# Patient Record
Sex: FEMALE | Race: WHITE | NOT HISPANIC OR LATINO | URBAN - METROPOLITAN AREA
[De-identification: names, ages, dates, MRNs, and addresses within clinical notes are randomized per-mention and may not be internally consistent; named-entity substitution may affect disease eponyms.]

---

## 2018-12-05 ENCOUNTER — EMERGENCY (EMERGENCY)
Facility: HOSPITAL | Age: 53
LOS: 1 days | Discharge: HOME | End: 2018-12-05
Attending: EMERGENCY MEDICINE | Admitting: EMERGENCY MEDICINE
Payer: COMMERCIAL

## 2018-12-05 VITALS
TEMPERATURE: 98 F | HEART RATE: 88 BPM | DIASTOLIC BLOOD PRESSURE: 74 MMHG | RESPIRATION RATE: 18 BRPM | SYSTOLIC BLOOD PRESSURE: 128 MMHG | OXYGEN SATURATION: 99 %

## 2018-12-05 VITALS
WEIGHT: 141.98 LBS | SYSTOLIC BLOOD PRESSURE: 134 MMHG | DIASTOLIC BLOOD PRESSURE: 92 MMHG | OXYGEN SATURATION: 97 % | RESPIRATION RATE: 16 BRPM | TEMPERATURE: 98 F | HEART RATE: 86 BPM

## 2018-12-05 DIAGNOSIS — Z90.49 ACQUIRED ABSENCE OF OTHER SPECIFIED PARTS OF DIGESTIVE TRACT: Chronic | ICD-10-CM

## 2018-12-05 LAB
ACETONE SERPL-MCNC: NEGATIVE — SIGNIFICANT CHANGE UP
ALBUMIN SERPL ELPH-MCNC: 3.8 G/DL — SIGNIFICANT CHANGE UP (ref 3.3–5)
ALP SERPL-CCNC: 101 U/L — SIGNIFICANT CHANGE UP (ref 30–120)
ALT FLD-CCNC: 68 U/L DA — HIGH (ref 10–60)
ANION GAP SERPL CALC-SCNC: 10 MMOL/L — SIGNIFICANT CHANGE UP (ref 5–17)
APPEARANCE UR: CLEAR — SIGNIFICANT CHANGE UP
AST SERPL-CCNC: 34 U/L — SIGNIFICANT CHANGE UP (ref 10–40)
BASE EXCESS BLDV CALC-SCNC: -0.6 MMOL/L — SIGNIFICANT CHANGE UP (ref -2–2)
BASOPHILS # BLD AUTO: 0.04 K/UL — SIGNIFICANT CHANGE UP (ref 0–0.2)
BASOPHILS NFR BLD AUTO: 0.4 % — SIGNIFICANT CHANGE UP (ref 0–2)
BILIRUB SERPL-MCNC: 0.3 MG/DL — SIGNIFICANT CHANGE UP (ref 0.2–1.2)
BILIRUB UR-MCNC: NEGATIVE — SIGNIFICANT CHANGE UP
BUN SERPL-MCNC: 16 MG/DL — SIGNIFICANT CHANGE UP (ref 7–23)
CALCIUM SERPL-MCNC: 9.4 MG/DL — SIGNIFICANT CHANGE UP (ref 8.4–10.5)
CHLORIDE SERPL-SCNC: 98 MMOL/L — SIGNIFICANT CHANGE UP (ref 96–108)
CO2 SERPL-SCNC: 26 MMOL/L — SIGNIFICANT CHANGE UP (ref 22–31)
COLOR SPEC: YELLOW — SIGNIFICANT CHANGE UP
CREAT SERPL-MCNC: 0.72 MG/DL — SIGNIFICANT CHANGE UP (ref 0.5–1.3)
DIFF PNL FLD: NEGATIVE — SIGNIFICANT CHANGE UP
EOSINOPHIL # BLD AUTO: 0.24 K/UL — SIGNIFICANT CHANGE UP (ref 0–0.5)
EOSINOPHIL NFR BLD AUTO: 2.3 % — SIGNIFICANT CHANGE UP (ref 0–6)
GAS PNL BLDV: SIGNIFICANT CHANGE UP
GLUCOSE BLDC GLUCOMTR-MCNC: 231 MG/DL — HIGH (ref 70–99)
GLUCOSE BLDC GLUCOMTR-MCNC: 386 MG/DL — HIGH (ref 70–99)
GLUCOSE SERPL-MCNC: 366 MG/DL — HIGH (ref 70–99)
GLUCOSE UR QL: 1000 MG/DL
HCO3 BLDV-SCNC: 24 MMOL/L — SIGNIFICANT CHANGE UP (ref 21–29)
HCT VFR BLD CALC: 44.2 % — SIGNIFICANT CHANGE UP (ref 34.5–45)
HGB BLD-MCNC: 15.2 G/DL — SIGNIFICANT CHANGE UP (ref 11.5–15.5)
HOROWITZ INDEX BLDV+IHG-RTO: 21 — SIGNIFICANT CHANGE UP
IMM GRANULOCYTES NFR BLD AUTO: 0.6 % — SIGNIFICANT CHANGE UP (ref 0–1.5)
KETONES UR-MCNC: NEGATIVE — SIGNIFICANT CHANGE UP
LEUKOCYTE ESTERASE UR-ACNC: ABNORMAL
LYMPHOCYTES # BLD AUTO: 4.82 K/UL — HIGH (ref 1–3.3)
LYMPHOCYTES # BLD AUTO: 46.2 % — HIGH (ref 13–44)
MCHC RBC-ENTMCNC: 30 PG — SIGNIFICANT CHANGE UP (ref 27–34)
MCHC RBC-ENTMCNC: 34.4 GM/DL — SIGNIFICANT CHANGE UP (ref 32–36)
MCV RBC AUTO: 87.4 FL — SIGNIFICANT CHANGE UP (ref 80–100)
MONOCYTES # BLD AUTO: 0.48 K/UL — SIGNIFICANT CHANGE UP (ref 0–0.9)
MONOCYTES NFR BLD AUTO: 4.6 % — SIGNIFICANT CHANGE UP (ref 2–14)
NEUTROPHILS # BLD AUTO: 4.79 K/UL — SIGNIFICANT CHANGE UP (ref 1.8–7.4)
NEUTROPHILS NFR BLD AUTO: 45.9 % — SIGNIFICANT CHANGE UP (ref 43–77)
NITRITE UR-MCNC: NEGATIVE — SIGNIFICANT CHANGE UP
PCO2 BLDV: 38 MMHG — SIGNIFICANT CHANGE UP (ref 35–50)
PH BLDV: 7.41 — SIGNIFICANT CHANGE UP (ref 7.35–7.45)
PH UR: 5 — SIGNIFICANT CHANGE UP (ref 5–8)
PLATELET # BLD AUTO: 238 K/UL — SIGNIFICANT CHANGE UP (ref 150–400)
PO2 BLDV: 52 — HIGH (ref 25–45)
POTASSIUM SERPL-MCNC: 4.4 MMOL/L — SIGNIFICANT CHANGE UP (ref 3.5–5.3)
POTASSIUM SERPL-SCNC: 4.4 MMOL/L — SIGNIFICANT CHANGE UP (ref 3.5–5.3)
PROT SERPL-MCNC: 7.6 G/DL — SIGNIFICANT CHANGE UP (ref 6–8.3)
PROT UR-MCNC: 15 MG/DL
RBC # BLD: 5.06 M/UL — SIGNIFICANT CHANGE UP (ref 3.8–5.2)
RBC # FLD: 13.8 % — SIGNIFICANT CHANGE UP (ref 10.3–14.5)
SAO2 % BLDV: 91 % — HIGH (ref 67–88)
SODIUM SERPL-SCNC: 134 MMOL/L — LOW (ref 135–145)
SP GR SPEC: 1.02 — SIGNIFICANT CHANGE UP (ref 1.01–1.02)
UROBILINOGEN FLD QL: NEGATIVE MG/DL — SIGNIFICANT CHANGE UP
WBC # BLD: 10.43 K/UL — SIGNIFICANT CHANGE UP (ref 3.8–10.5)
WBC # FLD AUTO: 10.43 K/UL — SIGNIFICANT CHANGE UP (ref 3.8–10.5)

## 2018-12-05 PROCEDURE — 93005 ELECTROCARDIOGRAM TRACING: CPT

## 2018-12-05 PROCEDURE — 82803 BLOOD GASES ANY COMBINATION: CPT

## 2018-12-05 PROCEDURE — 36415 COLL VENOUS BLD VENIPUNCTURE: CPT

## 2018-12-05 PROCEDURE — 85027 COMPLETE CBC AUTOMATED: CPT

## 2018-12-05 PROCEDURE — 82962 GLUCOSE BLOOD TEST: CPT

## 2018-12-05 PROCEDURE — 99284 EMERGENCY DEPT VISIT MOD MDM: CPT

## 2018-12-05 PROCEDURE — 82009 KETONE BODYS QUAL: CPT

## 2018-12-05 PROCEDURE — 93010 ELECTROCARDIOGRAM REPORT: CPT

## 2018-12-05 PROCEDURE — 81001 URINALYSIS AUTO W/SCOPE: CPT

## 2018-12-05 PROCEDURE — 80053 COMPREHEN METABOLIC PANEL: CPT

## 2018-12-05 PROCEDURE — 96360 HYDRATION IV INFUSION INIT: CPT

## 2018-12-05 PROCEDURE — 99283 EMERGENCY DEPT VISIT LOW MDM: CPT | Mod: 25

## 2018-12-05 RX ORDER — SODIUM CHLORIDE 9 MG/ML
3 INJECTION INTRAMUSCULAR; INTRAVENOUS; SUBCUTANEOUS ONCE
Qty: 0 | Refills: 0 | Status: COMPLETED | OUTPATIENT
Start: 2018-12-05 | End: 2018-12-05

## 2018-12-05 RX ORDER — SODIUM CHLORIDE 9 MG/ML
1000 INJECTION INTRAMUSCULAR; INTRAVENOUS; SUBCUTANEOUS ONCE
Qty: 0 | Refills: 0 | Status: COMPLETED | OUTPATIENT
Start: 2018-12-05 | End: 2018-12-05

## 2018-12-05 RX ADMIN — SODIUM CHLORIDE 1000 MILLILITER(S): 9 INJECTION INTRAMUSCULAR; INTRAVENOUS; SUBCUTANEOUS at 11:31

## 2018-12-05 RX ADMIN — SODIUM CHLORIDE 1000 MILLILITER(S): 9 INJECTION INTRAMUSCULAR; INTRAVENOUS; SUBCUTANEOUS at 12:14

## 2018-12-05 RX ADMIN — SODIUM CHLORIDE 3 MILLILITER(S): 9 INJECTION INTRAMUSCULAR; INTRAVENOUS; SUBCUTANEOUS at 11:30

## 2018-12-05 NOTE — ED PROVIDER NOTE - OBJECTIVE STATEMENT
54 y/o F with hx of DM presents with c/o hyperglycemia x 2 days. Pt states that she used to be on byeta and was changed to levimir 20 units at night since a week a ago. States that her sugars have been high since yesterday and difficult to control. States that it was >600 yesterday, spoke to PMD, Dr. Kenji Malone and advised to take levimir last night, took levimir 15 units this morning PTA after breakfast since sugar was 521. States that she has been feeling nauseous, lightheaded and shaky at times. Denies CP, SOB, abdominal pain, vomiting, vision changes, numbness or other symptoms. 52 y/o F with hx of DM presents with c/o hyperglycemia x 2 days. Pt states that she used to be on byeta and was changed to levemir 20 units at night since a week a ago. States that her sugars have been high since yesterday and difficult to control. States that it was >600 yesterday, spoke to PMD, Dr. Kenji Malone and advised to take levemir last night, took levemir 15 units again this morning PTA after breakfast since sugar was 521. States that she has been feeling nauseous, lightheaded and shaky at times. Denies CP, SOB, abdominal pain, vomiting, vision changes, numbness or other symptoms. 52 y/o F with hx of DM presents with c/o hyperglycemia x 2 days. Pt states that she used to be on byeta and was changed to levemir 20 units at night since a week a ago. States that her sugars have been high since yesterday and difficult to control. States that it was >600 yesterday, spoke to PMD, Dr. Kenji Malone and advised to take levemir last night, took levemir 15 units again this morning PTA after breakfast since sugar was 521. States that she has been feeling nauseous, lightheaded and shaky at times. Denies CP, SOB, abdominal pain, vomiting, vision changes, numbness or other symptoms. Pt does not have an endocrinologist.

## 2018-12-05 NOTE — ED PROVIDER NOTE - MEDICAL DECISION MAKING DETAILS
52 y/o F with hx of DM, recently started on levemir a week ago, c/o difficulty controlling blood sugars since yesterday, was 521 this am after breakfast, took levemir 15 units PTA, admits to lightheadedness, nausea and feeling shaky, no cp/sob/vomiting; will get labs, acetone, ekg, IVF, re-assess

## 2018-12-05 NOTE — ED PROVIDER NOTE - CONSTITUTIONAL, MLM
normal... Well appearing, well nourished, awake, alert, oriented to person, place, time/situation, appears slightly anxious

## 2018-12-05 NOTE — ED PROVIDER NOTE - ATTENDING CONTRIBUTION TO CARE
I have personally performed a face to face diagnostic evaluation on this patient.  I have reviewed the PA note and agree with the history, exam, and plan of care, except as noted.  History and Exam by me shows  here for elevated blood sugar today.  pt was started on Levimir about 1 week ago.  pt took Levimir 35 mg since this am.  pt was instructed to increase Levimir 20mg from once a day to twice a day by her pmd.  lungs- cta bl, cardiac- s1s2, rrr, lab sig for glucose 366

## 2018-12-05 NOTE — ED PROVIDER NOTE - CHIEF COMPLAINT
The patient is a 53y Female complaining of The patient is a 53y Female complaining of high blood sugar

## 2018-12-05 NOTE — ED ADULT NURSE NOTE - OBJECTIVE STATEMENT
patient has c/o hyperglycemia. lately patient has been more tired, denies any pain at this time and resting in bed and labs sent, IV accessed and tolerating. Pt is in no acute distress. will continue to monitor.

## 2018-12-05 NOTE — ED PROVIDER NOTE - PROGRESS NOTE DETAILS
Pt examined by ED attending, Dr. Pompa who agreed with disposition and plan. Reevaluated patient at bedside.  Patient feeling much improved.  Discussed the results of all diagnostic testing in ED and copies of all reports given. Will give pt info to f/u with endocrinologist and pmd.  An opportunity to ask questions was given.  Discussed the importance of prompt, close medical follow-up.  Patient will return with any changes, concerns or persistent / worsening symptoms.  Understanding of all instructions verbalized.

## 2019-03-09 ENCOUNTER — OUTPATIENT (OUTPATIENT)
Dept: OUTPATIENT SERVICES | Facility: HOSPITAL | Age: 54
LOS: 1 days | End: 2019-03-09
Payer: COMMERCIAL

## 2019-03-09 ENCOUNTER — APPOINTMENT (OUTPATIENT)
Dept: ULTRASOUND IMAGING | Facility: CLINIC | Age: 54
End: 2019-03-09
Payer: COMMERCIAL

## 2019-03-09 DIAGNOSIS — Z90.49 ACQUIRED ABSENCE OF OTHER SPECIFIED PARTS OF DIGESTIVE TRACT: Chronic | ICD-10-CM

## 2019-03-09 DIAGNOSIS — Z00.8 ENCOUNTER FOR OTHER GENERAL EXAMINATION: ICD-10-CM

## 2019-03-09 PROCEDURE — 76536 US EXAM OF HEAD AND NECK: CPT

## 2019-03-09 PROCEDURE — 76536 US EXAM OF HEAD AND NECK: CPT | Mod: 26

## 2019-03-25 ENCOUNTER — OUTPATIENT (OUTPATIENT)
Dept: OUTPATIENT SERVICES | Facility: HOSPITAL | Age: 54
LOS: 1 days | End: 2019-03-25
Payer: COMMERCIAL

## 2019-03-25 ENCOUNTER — APPOINTMENT (OUTPATIENT)
Dept: ULTRASOUND IMAGING | Facility: CLINIC | Age: 54
End: 2019-03-25
Payer: COMMERCIAL

## 2019-03-25 DIAGNOSIS — Z90.49 ACQUIRED ABSENCE OF OTHER SPECIFIED PARTS OF DIGESTIVE TRACT: Chronic | ICD-10-CM

## 2019-03-25 DIAGNOSIS — Z00.8 ENCOUNTER FOR OTHER GENERAL EXAMINATION: ICD-10-CM

## 2019-03-25 PROCEDURE — 76536 US EXAM OF HEAD AND NECK: CPT

## 2019-03-25 PROCEDURE — 76536 US EXAM OF HEAD AND NECK: CPT | Mod: 26

## 2020-02-12 ENCOUNTER — OUTPATIENT (OUTPATIENT)
Dept: OUTPATIENT SERVICES | Facility: HOSPITAL | Age: 55
LOS: 1 days | Discharge: HOME | End: 2020-02-12
Payer: COMMERCIAL

## 2020-02-12 VITALS
OXYGEN SATURATION: 95 % | HEIGHT: 64 IN | WEIGHT: 158.51 LBS | HEART RATE: 89 BPM | DIASTOLIC BLOOD PRESSURE: 72 MMHG | TEMPERATURE: 99 F | SYSTOLIC BLOOD PRESSURE: 123 MMHG | RESPIRATION RATE: 18 BRPM

## 2020-02-12 DIAGNOSIS — Z90.49 ACQUIRED ABSENCE OF OTHER SPECIFIED PARTS OF DIGESTIVE TRACT: Chronic | ICD-10-CM

## 2020-02-12 DIAGNOSIS — Z01.818 ENCOUNTER FOR OTHER PREPROCEDURAL EXAMINATION: ICD-10-CM

## 2020-02-12 DIAGNOSIS — M48.061 SPINAL STENOSIS, LUMBAR REGION WITHOUT NEUROGENIC CLAUDICATION: ICD-10-CM

## 2020-02-12 LAB
ANION GAP SERPL CALC-SCNC: 6 MMOL/L — SIGNIFICANT CHANGE UP (ref 5–17)
APTT BLD: 31.4 SEC — SIGNIFICANT CHANGE UP (ref 28.5–37)
BLD GP AB SCN SERPL QL: SIGNIFICANT CHANGE UP
BUN SERPL-MCNC: 11 MG/DL — SIGNIFICANT CHANGE UP (ref 7–23)
CALCIUM SERPL-MCNC: 9.2 MG/DL — SIGNIFICANT CHANGE UP (ref 8.5–10.1)
CHLORIDE SERPL-SCNC: 109 MMOL/L — HIGH (ref 96–108)
CO2 SERPL-SCNC: 26 MMOL/L — SIGNIFICANT CHANGE UP (ref 22–31)
CREAT SERPL-MCNC: 0.7 MG/DL — SIGNIFICANT CHANGE UP (ref 0.5–1.3)
GLUCOSE SERPL-MCNC: 154 MG/DL — HIGH (ref 70–99)
HCT VFR BLD CALC: 46.9 % — HIGH (ref 34.5–45)
HGB BLD-MCNC: 15.4 G/DL — SIGNIFICANT CHANGE UP (ref 11.5–15.5)
INR BLD: 0.95 RATIO — SIGNIFICANT CHANGE UP (ref 0.88–1.16)
MCHC RBC-ENTMCNC: 29.7 PG — SIGNIFICANT CHANGE UP (ref 27–34)
MCHC RBC-ENTMCNC: 32.8 GM/DL — SIGNIFICANT CHANGE UP (ref 32–36)
MCV RBC AUTO: 90.4 FL — SIGNIFICANT CHANGE UP (ref 80–100)
NRBC # BLD: 0 /100 WBCS — SIGNIFICANT CHANGE UP (ref 0–0)
PLATELET # BLD AUTO: 275 K/UL — SIGNIFICANT CHANGE UP (ref 150–400)
POTASSIUM SERPL-MCNC: 3.8 MMOL/L — SIGNIFICANT CHANGE UP (ref 3.5–5.3)
POTASSIUM SERPL-SCNC: 3.8 MMOL/L — SIGNIFICANT CHANGE UP (ref 3.5–5.3)
PROTHROM AB SERPL-ACNC: 10.6 SEC — SIGNIFICANT CHANGE UP (ref 10–12.9)
RBC # BLD: 5.19 M/UL — SIGNIFICANT CHANGE UP (ref 3.8–5.2)
RBC # FLD: 14.6 % — HIGH (ref 10.3–14.5)
SODIUM SERPL-SCNC: 141 MMOL/L — SIGNIFICANT CHANGE UP (ref 135–145)
WBC # BLD: 12.76 K/UL — HIGH (ref 3.8–10.5)
WBC # FLD AUTO: 12.76 K/UL — HIGH (ref 3.8–10.5)

## 2020-02-12 PROCEDURE — 93010 ELECTROCARDIOGRAM REPORT: CPT

## 2020-02-12 RX ORDER — INSULIN DETEMIR 100/ML (3)
20 INSULIN PEN (ML) SUBCUTANEOUS
Qty: 0 | Refills: 0 | DISCHARGE

## 2020-02-12 RX ORDER — DULAGLUTIDE 4.5 MG/.5ML
0 INJECTION, SOLUTION SUBCUTANEOUS
Qty: 0 | Refills: 0 | DISCHARGE

## 2020-02-12 NOTE — H&P PST ADULT - HISTORY OF PRESENT ILLNESS
54 year old female with a past medical history of diabetes and hyperlipidemia c/o pain to her lower back that radiates down her right and left leg and numbness to her right upper thigh down to her knee.  She is scheduled for a transforaminal lumbar interbody fusion L5-S1 on 2/18/20.

## 2020-02-12 NOTE — H&P PST ADULT - ASSESSMENT
54 year old female with a past medical history of diabetes and hyperlipidemia c/o pain to her lower back that radiates down her right and left leg and numbness to right upper thigh down to her knee.  She is scheduled for a transforaminal lumbar interbody fusion L5-S1 on 20.     CAPRINI SCORE [CLOT]    AGE RELATED RISK FACTORS                                                       MOBILITY RELATED FACTORS  [x] Age 41-60 years                                            (1 Point)                  [ ] Bed rest                                                        (1 Point)  [ ] Age: 61-74 years                                           (2 Points)                 [ ] Plaster cast                                                   (2 Points)  [ ] Age= 75 years                                              (3 Points)                 [ ] Bed bound for more than 72 hours                 (2 Points)    DISEASE RELATED RISK FACTORS                                               GENDER SPECIFIC FACTORS  [ ] Edema in the lower extremities                       (1 Point)                  [ ] Pregnancy                                                     (1 Point)  [ ] Varicose veins                                               (1 Point)                  [ ] Post-partum < 6 weeks                                   (1 Point)             [ ] BMI > 25 Kg/m2                                            (1 Point)                  [ ] Hormonal therapy  or oral contraception          (1 Point)                 [ ] Sepsis (in the previous month)                        (1 Point)                  [ ] History of pregnancy complications                 (1 point)  [ ] Pneumonia or serious lung disease                                               [ ] Unexplained or recurrent                     (1 Point)           (in the previous month)                               (1 Point)  [ ] Abnormal pulmonary function test                     (1 Point)                 SURGERY RELATED RISK FACTORS  [ ] Acute myocardial infarction                              (1 Point)                 [ ]  Section                                             (1 Point)  [ ] Congestive heart failure (in the previous month)  (1 Point)               [ ] Minor surgery                                                  (1 Point)   [ ] Inflammatory bowel disease                             (1 Point)                 [ ] Arthroscopic surgery                                        (2 Points)  [ ] Central venous access                                      (2 Points)                [x ] General surgery lasting more than 45 minutes   (2 Points)       [ ] Stroke (in the previous month)                          (5 Points)               [ ] Elective arthroplasty                                         (5 Points)                                                                                                                                               HEMATOLOGY RELATED FACTORS                                                 TRAUMA RELATED RISK FACTORS  [ ] Prior episodes of VTE                                     (3 Points)                [ ] Fracture of the hip, pelvis, or leg                       (5 Points)  [ ] Positive family history for VTE                         (3 Points)                 [ ] Acute spinal cord injury (in the previous month)  (5 Points)  [ ] Prothrombin 31311 A                                     (3 Points)                 [ ] Paralysis  (less than 1 month)                             (5 Points)  [ ] Factor V Leiden                                             (3 Points)                  [ ] Multiple Trauma within 1 month                        (5 Points)  [ ] Lupus anticoagulants                                     (3 Points)                                                           [ ] Anticardiolipin antibodies                               (3 Points)                                                       [ ] High homocysteine in the blood                      (3 Points)                                             [ ] Other congenital or acquired thrombophilia      (3 Points)                                                [ ] Heparin induced thrombocytopenia                  (3 Points)                                          Total Score [  3        ]    Caprini Score 0 - 2:  Low Risk, No VTE Prophylaxis required for most patients, encourage ambulation  Caprini Score 3 - 6:  At Risk, pharmacologic VTE prophylaxis is indicated for most patients (in the absence of a contraindication)  Caprini Score Greater than or = 7:  High Risk, pharmacologic VTE prophylaxis is indicated for most patients (in the absence of a contraindication)

## 2020-02-12 NOTE — H&P PST ADULT - NSICDXPROBLEM_GEN_ALL_CORE_FT
PROBLEM DIAGNOSES  Problem: Lumbar stenosis without neurogenic claudication  Assessment and Plan: Transforaminal lumbar interbody fusion posteror spinal fusion laminectomy L5-S1 with image

## 2020-02-12 NOTE — H&P PST ADULT - NSANTHOSAYNRD_GEN_A_CORE
No. KO screening performed.  STOP BANG Legend: 0-2 = LOW Risk; 3-4 = INTERMEDIATE Risk; 5-8 = HIGH Risk

## 2020-02-12 NOTE — H&P PST ADULT - ATTENDING COMMENTS
indicated for decomrpessoin - we initially planned fusion but denied by insurance - decomrepssion next best option - r/b/e of the procedure discussed and questions answered - she is well informed and would like to proceed

## 2020-02-13 LAB
HBA1C BLD-MCNC: 6.6 % — HIGH (ref 4–5.6)
MRSA PCR RESULT.: SIGNIFICANT CHANGE UP
S AUREUS DNA NOSE QL NAA+PROBE: SIGNIFICANT CHANGE UP

## 2020-02-17 ENCOUNTER — TRANSCRIPTION ENCOUNTER (OUTPATIENT)
Age: 55
End: 2020-02-17

## 2020-02-18 ENCOUNTER — INPATIENT (INPATIENT)
Facility: HOSPITAL | Age: 55
LOS: 0 days | Discharge: HOME | End: 2020-02-18
Attending: ORTHOPAEDIC SURGERY | Admitting: ORTHOPAEDIC SURGERY

## 2020-02-18 VITALS
SYSTOLIC BLOOD PRESSURE: 111 MMHG | TEMPERATURE: 98 F | OXYGEN SATURATION: 96 % | HEART RATE: 74 BPM | RESPIRATION RATE: 12 BRPM | DIASTOLIC BLOOD PRESSURE: 53 MMHG

## 2020-02-18 VITALS
RESPIRATION RATE: 15 BRPM | HEART RATE: 84 BPM | SYSTOLIC BLOOD PRESSURE: 104 MMHG | HEIGHT: 63 IN | OXYGEN SATURATION: 97 % | WEIGHT: 160.06 LBS | TEMPERATURE: 99 F | DIASTOLIC BLOOD PRESSURE: 67 MMHG

## 2020-02-18 DIAGNOSIS — Z90.49 ACQUIRED ABSENCE OF OTHER SPECIFIED PARTS OF DIGESTIVE TRACT: Chronic | ICD-10-CM

## 2020-02-18 LAB
BLD GP AB SCN SERPL QL: SIGNIFICANT CHANGE UP
GLUCOSE BLDC GLUCOMTR-MCNC: 174 MG/DL — HIGH (ref 70–99)

## 2020-02-18 RX ORDER — METOCLOPRAMIDE HCL 10 MG
10 TABLET ORAL ONCE
Refills: 0 | Status: DISCONTINUED | OUTPATIENT
Start: 2020-02-18 | End: 2020-02-18

## 2020-02-18 RX ORDER — SODIUM CHLORIDE 9 MG/ML
1000 INJECTION, SOLUTION INTRAVENOUS
Refills: 0 | Status: DISCONTINUED | OUTPATIENT
Start: 2020-02-18 | End: 2020-02-18

## 2020-02-18 RX ORDER — HYDROMORPHONE HYDROCHLORIDE 2 MG/ML
0.5 INJECTION INTRAMUSCULAR; INTRAVENOUS; SUBCUTANEOUS
Refills: 0 | Status: DISCONTINUED | OUTPATIENT
Start: 2020-02-18 | End: 2020-02-18

## 2020-02-18 RX ORDER — ACETAMINOPHEN 500 MG
1000 TABLET ORAL ONCE
Refills: 0 | Status: COMPLETED | OUTPATIENT
Start: 2020-02-18 | End: 2020-02-18

## 2020-02-18 RX ORDER — SODIUM CHLORIDE 9 MG/ML
3 INJECTION INTRAMUSCULAR; INTRAVENOUS; SUBCUTANEOUS EVERY 8 HOURS
Refills: 0 | Status: DISCONTINUED | OUTPATIENT
Start: 2020-02-18 | End: 2020-02-18

## 2020-02-18 RX ADMIN — HYDROMORPHONE HYDROCHLORIDE 0.5 MILLIGRAM(S): 2 INJECTION INTRAMUSCULAR; INTRAVENOUS; SUBCUTANEOUS at 15:49

## 2020-02-18 RX ADMIN — HYDROMORPHONE HYDROCHLORIDE 0.5 MILLIGRAM(S): 2 INJECTION INTRAMUSCULAR; INTRAVENOUS; SUBCUTANEOUS at 15:23

## 2020-02-18 RX ADMIN — Medication 1000 MILLIGRAM(S): at 15:53

## 2020-02-18 RX ADMIN — SODIUM CHLORIDE 75 MILLILITER(S): 9 INJECTION, SOLUTION INTRAVENOUS at 15:23

## 2020-02-18 RX ADMIN — Medication 400 MILLIGRAM(S): at 15:23

## 2020-02-18 RX ADMIN — HYDROMORPHONE HYDROCHLORIDE 0.5 MILLIGRAM(S): 2 INJECTION INTRAMUSCULAR; INTRAVENOUS; SUBCUTANEOUS at 15:38

## 2020-02-18 RX ADMIN — HYDROMORPHONE HYDROCHLORIDE 0.5 MILLIGRAM(S): 2 INJECTION INTRAMUSCULAR; INTRAVENOUS; SUBCUTANEOUS at 16:04

## 2020-02-18 NOTE — ASU DISCHARGE PLAN (ADULT/PEDIATRIC) - CALL YOUR DOCTOR IF YOU HAVE ANY OF THE FOLLOWING:
Swelling that gets worse/Wound/Surgical Site with redness, or foul smelling discharge or pus/Fever greater than (need to indicate Fahrenheit or Celsius)/Nausea and vomiting that does not stop

## 2020-02-25 DIAGNOSIS — M54.16 RADICULOPATHY, LUMBAR REGION: ICD-10-CM

## 2020-02-25 DIAGNOSIS — M48.061 SPINAL STENOSIS, LUMBAR REGION WITHOUT NEUROGENIC CLAUDICATION: ICD-10-CM

## 2020-02-25 DIAGNOSIS — M43.17 SPONDYLOLISTHESIS, LUMBOSACRAL REGION: ICD-10-CM

## 2020-07-13 ENCOUNTER — OUTPATIENT (OUTPATIENT)
Dept: OUTPATIENT SERVICES | Facility: HOSPITAL | Age: 55
LOS: 1 days | Discharge: HOME | End: 2020-07-13
Payer: COMMERCIAL

## 2020-07-13 VITALS
WEIGHT: 160.94 LBS | RESPIRATION RATE: 18 BRPM | OXYGEN SATURATION: 97 % | SYSTOLIC BLOOD PRESSURE: 125 MMHG | HEART RATE: 87 BPM | TEMPERATURE: 98 F | HEIGHT: 64 IN | DIASTOLIC BLOOD PRESSURE: 71 MMHG

## 2020-07-13 DIAGNOSIS — Z01.818 ENCOUNTER FOR OTHER PREPROCEDURAL EXAMINATION: ICD-10-CM

## 2020-07-13 DIAGNOSIS — Z98.890 OTHER SPECIFIED POSTPROCEDURAL STATES: Chronic | ICD-10-CM

## 2020-07-13 DIAGNOSIS — M51.26 OTHER INTERVERTEBRAL DISC DISPLACEMENT, LUMBAR REGION: ICD-10-CM

## 2020-07-13 DIAGNOSIS — Z90.49 ACQUIRED ABSENCE OF OTHER SPECIFIED PARTS OF DIGESTIVE TRACT: Chronic | ICD-10-CM

## 2020-07-13 LAB
A1C WITH ESTIMATED AVERAGE GLUCOSE RESULT: 6.3 % — HIGH (ref 4–5.6)
ANION GAP SERPL CALC-SCNC: 5 MMOL/L — SIGNIFICANT CHANGE UP (ref 5–17)
APTT BLD: 32.4 SEC — SIGNIFICANT CHANGE UP (ref 27.5–35.5)
BLD GP AB SCN SERPL QL: SIGNIFICANT CHANGE UP
BUN SERPL-MCNC: 9 MG/DL — SIGNIFICANT CHANGE UP (ref 7–23)
CALCIUM SERPL-MCNC: 9.7 MG/DL — SIGNIFICANT CHANGE UP (ref 8.5–10.1)
CHLORIDE SERPL-SCNC: 105 MMOL/L — SIGNIFICANT CHANGE UP (ref 96–108)
CO2 SERPL-SCNC: 29 MMOL/L — SIGNIFICANT CHANGE UP (ref 22–31)
CREAT SERPL-MCNC: 0.79 MG/DL — SIGNIFICANT CHANGE UP (ref 0.5–1.3)
ESTIMATED AVERAGE GLUCOSE: 134 MG/DL — HIGH (ref 68–114)
GLUCOSE SERPL-MCNC: 101 MG/DL — HIGH (ref 70–99)
HCT VFR BLD CALC: 48.7 % — HIGH (ref 34.5–45)
HGB BLD-MCNC: 15.9 G/DL — HIGH (ref 11.5–15.5)
INR BLD: 0.99 RATIO — SIGNIFICANT CHANGE UP (ref 0.88–1.16)
MCHC RBC-ENTMCNC: 29.5 PG — SIGNIFICANT CHANGE UP (ref 27–34)
MCHC RBC-ENTMCNC: 32.6 GM/DL — SIGNIFICANT CHANGE UP (ref 32–36)
MCV RBC AUTO: 90.4 FL — SIGNIFICANT CHANGE UP (ref 80–100)
MRSA PCR RESULT.: SIGNIFICANT CHANGE UP
NRBC # BLD: 0 /100 WBCS — SIGNIFICANT CHANGE UP (ref 0–0)
PLATELET # BLD AUTO: 281 K/UL — SIGNIFICANT CHANGE UP (ref 150–400)
POTASSIUM SERPL-MCNC: 4.8 MMOL/L — SIGNIFICANT CHANGE UP (ref 3.5–5.3)
POTASSIUM SERPL-SCNC: 4.8 MMOL/L — SIGNIFICANT CHANGE UP (ref 3.5–5.3)
PROTHROM AB SERPL-ACNC: 11.1 SEC — SIGNIFICANT CHANGE UP (ref 10.6–13.6)
RBC # BLD: 5.39 M/UL — HIGH (ref 3.8–5.2)
RBC # FLD: 14.8 % — HIGH (ref 10.3–14.5)
S AUREUS DNA NOSE QL NAA+PROBE: SIGNIFICANT CHANGE UP
SODIUM SERPL-SCNC: 139 MMOL/L — SIGNIFICANT CHANGE UP (ref 135–145)
WBC # BLD: 12.77 K/UL — HIGH (ref 3.8–10.5)
WBC # FLD AUTO: 12.77 K/UL — HIGH (ref 3.8–10.5)

## 2020-07-13 PROCEDURE — 93010 ELECTROCARDIOGRAM REPORT: CPT

## 2020-07-13 RX ORDER — GABAPENTIN 400 MG/1
1 CAPSULE ORAL
Qty: 0 | Refills: 0 | DISCHARGE

## 2020-07-13 RX ORDER — ERGOCALCIFEROL 1.25 MG/1
1 CAPSULE ORAL
Qty: 0 | Refills: 0 | DISCHARGE

## 2020-07-13 RX ORDER — ATORVASTATIN CALCIUM 80 MG/1
1 TABLET, FILM COATED ORAL
Qty: 0 | Refills: 0 | DISCHARGE

## 2020-07-13 NOTE — H&P PST ADULT - NSICDXPROBLEM_GEN_ALL_CORE_FT
PROBLEM DIAGNOSES  Problem: Other intervertebral disc displacement, lumbar region  Assessment and Plan: Transforaminal lumbar interbody fusion L5-S1 on 7/23/2020    Problem: Preop examination  Assessment and Plan: labs - cbc,pt/ptt,bmp,t&s,nose cx,ekg  M/C required  preop 3 day hibiclens instruction reviewed and given .instructed on if  nose cx positive use mupuricin 5 days and checklist given  take routine meds DOS with sips of water. avoid NSAID and OTC supplements. verbalized understanding  information on proper nutrition , increase protein and better food choices provided in packet

## 2020-07-13 NOTE — H&P PST ADULT - HISTORY OF PRESENT ILLNESS
55 year old female with a past medical history of diabetes and hyperlipidemia c/o pain to her lower back that radiates down her right and left leg with numbness to her right upper thigh down to her knee.  She is scheduled for a transforaminal lumbar interbody fusion L5-S1 on 7/23/2020.     She denies fever, cough, SOB, recent travels, and sick contacts.

## 2020-07-13 NOTE — H&P PST ADULT - ATTENDING COMMENTS
lumbar spondylolisthesis/stenosis - indicated for TLIF L5-S1 - r/b/e of the operation discussed and questions answered - she is well informed and would like to proceed

## 2020-07-13 NOTE — H&P PST ADULT - ASSESSMENT
55 year old female with a past medical history of diabetes and hyperlipidemia c/o pain to her lower back that radiates down her right and left leg with numbness to her right upper thigh down to her knee.  She is scheduled for a transforaminal lumbar interbody fusion L5-S1 on 2020.    CAPRINI SCORE [CLOT]    AGE RELATED RISK FACTORS                                                       MOBILITY RELATED FACTORS  [x] Age 41-60 years                                            (1 Point)                  [ ] Bed rest                                                        (1 Point)  [ ] Age: 61-74 years                                           (2 Points)                 [ ] Plaster cast                                                   (2 Points)  [ ] Age= 75 years                                              (3 Points)                 [ ] Bed bound for more than 72 hours                 (2 Points)    DISEASE RELATED RISK FACTORS                                               GENDER SPECIFIC FACTORS  [ ] Edema in the lower extremities                       (1 Point)                  [ ] Pregnancy                                                     (1 Point)  [ ] Varicose veins                                               (1 Point)                  [ ] Post-partum < 6 weeks                                   (1 Point)             [ ] BMI > 25 Kg/m2                                            (1 Point)                  [ ] Hormonal therapy  or oral contraception          (1 Point)                 [ ] Sepsis (in the previous month)                        (1 Point)                  [ ] History of pregnancy complications                 (1 point)  [ ] Pneumonia or serious lung disease                                               [ ] Unexplained or recurrent                     (1 Point)           (in the previous month)                               (1 Point)  [ ] Abnormal pulmonary function test                     (1 Point)                 SURGERY RELATED RISK FACTORS  [ ] Acute myocardial infarction                              (1 Point)                 [ ]  Section                                             (1 Point)  [ ] Congestive heart failure (in the previous month)  (1 Point)               [ ] Minor surgery                                                  (1 Point)   [ ] Inflammatory bowel disease                             (1 Point)                 [ ] Arthroscopic surgery                                        (2 Points)  [ ] Central venous access                                      (2 Points)                [x ] General surgery lasting more than 45 minutes   (2 Points)       [ ] Stroke (in the previous month)                          (5 Points)               [ ] Elective arthroplasty                                         (5 Points)                                                                                                                                               HEMATOLOGY RELATED FACTORS                                                 TRAUMA RELATED RISK FACTORS  [ ] Prior episodes of VTE                                     (3 Points)                [ ] Fracture of the hip, pelvis, or leg                       (5 Points)  [ ] Positive family history for VTE                         (3 Points)                 [ ] Acute spinal cord injury (in the previous month)  (5 Points)  [ ] Prothrombin 06890 A                                     (3 Points)                 [ ] Paralysis  (less than 1 month)                             (5 Points)  [ ] Factor V Leiden                                             (3 Points)                  [ ] Multiple Trauma within 1 month                        (5 Points)  [ ] Lupus anticoagulants                                     (3 Points)                                                           [ ] Anticardiolipin antibodies                               (3 Points)                                                       [ ] High homocysteine in the blood                      (3 Points)                                             [ ] Other congenital or acquired thrombophilia      (3 Points)                                                [ ] Heparin induced thrombocytopenia                  (3 Points)                                          Total Score [  3        ]    Caprini Score 0 - 2:  Low Risk, No VTE Prophylaxis required for most patients, encourage ambulation  Caprini Score 3 - 6:  At Risk, pharmacologic VTE prophylaxis is indicated for most patients (in the absence of a contraindication)  Caprini Score Greater than or = 7:  High Risk, pharmacologic VTE prophylaxis is indicated for most patients (in the absence of a contraindication)

## 2020-07-20 ENCOUNTER — OUTPATIENT (OUTPATIENT)
Dept: OUTPATIENT SERVICES | Facility: HOSPITAL | Age: 55
LOS: 1 days | Discharge: HOME | End: 2020-07-20

## 2020-07-20 DIAGNOSIS — U07.1 COVID-19: ICD-10-CM

## 2020-07-20 DIAGNOSIS — Z98.890 OTHER SPECIFIED POSTPROCEDURAL STATES: Chronic | ICD-10-CM

## 2020-07-20 DIAGNOSIS — Z90.49 ACQUIRED ABSENCE OF OTHER SPECIFIED PARTS OF DIGESTIVE TRACT: Chronic | ICD-10-CM

## 2020-07-20 PROBLEM — C76.0 MALIGNANT NEOPLASM OF HEAD, FACE AND NECK: Chronic | Status: ACTIVE | Noted: 2020-07-13

## 2020-07-20 LAB — SARS-COV-2 RNA SPEC QL NAA+PROBE: SIGNIFICANT CHANGE UP

## 2020-07-22 ENCOUNTER — TRANSCRIPTION ENCOUNTER (OUTPATIENT)
Age: 55
End: 2020-07-22

## 2020-07-23 ENCOUNTER — INPATIENT (INPATIENT)
Facility: HOSPITAL | Age: 55
LOS: 2 days | Discharge: HOME | End: 2020-07-26
Attending: ORTHOPAEDIC SURGERY | Admitting: ORTHOPAEDIC SURGERY

## 2020-07-23 ENCOUNTER — TRANSCRIPTION ENCOUNTER (OUTPATIENT)
Age: 55
End: 2020-07-23

## 2020-07-23 VITALS
SYSTOLIC BLOOD PRESSURE: 145 MMHG | DIASTOLIC BLOOD PRESSURE: 82 MMHG | HEART RATE: 86 BPM | OXYGEN SATURATION: 98 % | TEMPERATURE: 98 F | HEIGHT: 63 IN | RESPIRATION RATE: 18 BRPM | WEIGHT: 160.06 LBS

## 2020-07-23 DIAGNOSIS — Z90.49 ACQUIRED ABSENCE OF OTHER SPECIFIED PARTS OF DIGESTIVE TRACT: Chronic | ICD-10-CM

## 2020-07-23 DIAGNOSIS — Z98.890 OTHER SPECIFIED POSTPROCEDURAL STATES: Chronic | ICD-10-CM

## 2020-07-23 LAB
ANION GAP SERPL CALC-SCNC: 7 MMOL/L — SIGNIFICANT CHANGE UP (ref 5–17)
BASOPHILS # BLD AUTO: 0.04 K/UL — SIGNIFICANT CHANGE UP (ref 0–0.2)
BASOPHILS NFR BLD AUTO: 0.3 % — SIGNIFICANT CHANGE UP (ref 0–2)
BLD GP AB SCN SERPL QL: SIGNIFICANT CHANGE UP
BUN SERPL-MCNC: 12 MG/DL — SIGNIFICANT CHANGE UP (ref 7–23)
CALCIUM SERPL-MCNC: 8.9 MG/DL — SIGNIFICANT CHANGE UP (ref 8.5–10.1)
CHLORIDE SERPL-SCNC: 109 MMOL/L — HIGH (ref 96–108)
CO2 SERPL-SCNC: 24 MMOL/L — SIGNIFICANT CHANGE UP (ref 22–31)
CREAT SERPL-MCNC: 0.73 MG/DL — SIGNIFICANT CHANGE UP (ref 0.5–1.3)
EOSINOPHIL # BLD AUTO: 0.1 K/UL — SIGNIFICANT CHANGE UP (ref 0–0.5)
EOSINOPHIL NFR BLD AUTO: 0.7 % — SIGNIFICANT CHANGE UP (ref 0–6)
GLUCOSE BLDC GLUCOMTR-MCNC: 137 MG/DL — HIGH (ref 70–99)
GLUCOSE BLDC GLUCOMTR-MCNC: 172 MG/DL — HIGH (ref 70–99)
GLUCOSE BLDC GLUCOMTR-MCNC: 240 MG/DL — HIGH (ref 70–99)
GLUCOSE SERPL-MCNC: 220 MG/DL — HIGH (ref 70–99)
HCT VFR BLD CALC: 43.8 % — SIGNIFICANT CHANGE UP (ref 34.5–45)
HGB BLD-MCNC: 14.7 G/DL — SIGNIFICANT CHANGE UP (ref 11.5–15.5)
IMM GRANULOCYTES NFR BLD AUTO: 0.9 % — SIGNIFICANT CHANGE UP (ref 0–1.5)
LYMPHOCYTES # BLD AUTO: 1.99 K/UL — SIGNIFICANT CHANGE UP (ref 1–3.3)
LYMPHOCYTES # BLD AUTO: 14.5 % — SIGNIFICANT CHANGE UP (ref 13–44)
MCHC RBC-ENTMCNC: 29.8 PG — SIGNIFICANT CHANGE UP (ref 27–34)
MCHC RBC-ENTMCNC: 33.6 GM/DL — SIGNIFICANT CHANGE UP (ref 32–36)
MCV RBC AUTO: 88.8 FL — SIGNIFICANT CHANGE UP (ref 80–100)
MONOCYTES # BLD AUTO: 0.19 K/UL — SIGNIFICANT CHANGE UP (ref 0–0.9)
MONOCYTES NFR BLD AUTO: 1.4 % — LOW (ref 2–14)
NEUTROPHILS # BLD AUTO: 11.3 K/UL — HIGH (ref 1.8–7.4)
NEUTROPHILS NFR BLD AUTO: 82.2 % — HIGH (ref 43–77)
NRBC # BLD: 0 /100 WBCS — SIGNIFICANT CHANGE UP (ref 0–0)
PLATELET # BLD AUTO: 241 K/UL — SIGNIFICANT CHANGE UP (ref 150–400)
POTASSIUM SERPL-MCNC: 4.3 MMOL/L — SIGNIFICANT CHANGE UP (ref 3.5–5.3)
POTASSIUM SERPL-SCNC: 4.3 MMOL/L — SIGNIFICANT CHANGE UP (ref 3.5–5.3)
RBC # BLD: 4.93 M/UL — SIGNIFICANT CHANGE UP (ref 3.8–5.2)
RBC # FLD: 14 % — SIGNIFICANT CHANGE UP (ref 10.3–14.5)
SODIUM SERPL-SCNC: 140 MMOL/L — SIGNIFICANT CHANGE UP (ref 135–145)
WBC # BLD: 13.75 K/UL — HIGH (ref 3.8–10.5)
WBC # FLD AUTO: 13.75 K/UL — HIGH (ref 3.8–10.5)

## 2020-07-23 RX ORDER — INSULIN LISPRO 100/ML
30 VIAL (ML) SUBCUTANEOUS
Refills: 0 | Status: DISCONTINUED | OUTPATIENT
Start: 2020-07-24 | End: 2020-07-26

## 2020-07-23 RX ORDER — HYDROMORPHONE HYDROCHLORIDE 2 MG/ML
1 INJECTION INTRAMUSCULAR; INTRAVENOUS; SUBCUTANEOUS
Refills: 0 | Status: DISCONTINUED | OUTPATIENT
Start: 2020-07-23 | End: 2020-07-23

## 2020-07-23 RX ORDER — INSULIN LISPRO 100/ML
100 VIAL (ML) SUBCUTANEOUS
Refills: 0 | Status: DISCONTINUED | OUTPATIENT
Start: 2020-07-23 | End: 2020-07-23

## 2020-07-23 RX ORDER — GLIMEPIRIDE 1 MG
2 TABLET ORAL
Refills: 0 | Status: DISCONTINUED | OUTPATIENT
Start: 2020-07-23 | End: 2020-07-23

## 2020-07-23 RX ORDER — OXYCODONE HYDROCHLORIDE 5 MG/1
10 TABLET ORAL EVERY 4 HOURS
Refills: 0 | Status: DISCONTINUED | OUTPATIENT
Start: 2020-07-23 | End: 2020-07-26

## 2020-07-23 RX ORDER — HYDROMORPHONE HYDROCHLORIDE 2 MG/ML
0.5 INJECTION INTRAMUSCULAR; INTRAVENOUS; SUBCUTANEOUS
Refills: 0 | Status: DISCONTINUED | OUTPATIENT
Start: 2020-07-23 | End: 2020-07-23

## 2020-07-23 RX ORDER — MORPHINE SULFATE 50 MG/1
2 CAPSULE, EXTENDED RELEASE ORAL
Refills: 0 | Status: DISCONTINUED | OUTPATIENT
Start: 2020-07-23 | End: 2020-07-23

## 2020-07-23 RX ORDER — SODIUM CHLORIDE 9 MG/ML
1000 INJECTION, SOLUTION INTRAVENOUS
Refills: 0 | Status: DISCONTINUED | OUTPATIENT
Start: 2020-07-23 | End: 2020-07-26

## 2020-07-23 RX ORDER — INSULIN LISPRO 100/ML
30 VIAL (ML) SUBCUTANEOUS
Refills: 0 | Status: DISCONTINUED | OUTPATIENT
Start: 2020-07-23 | End: 2020-07-26

## 2020-07-23 RX ORDER — OXYCODONE HYDROCHLORIDE 5 MG/1
5 TABLET ORAL EVERY 4 HOURS
Refills: 0 | Status: DISCONTINUED | OUTPATIENT
Start: 2020-07-23 | End: 2020-07-26

## 2020-07-23 RX ORDER — INSULIN GLARGINE 100 [IU]/ML
60 INJECTION, SOLUTION SUBCUTANEOUS AT BEDTIME
Refills: 0 | Status: DISCONTINUED | OUTPATIENT
Start: 2020-07-23 | End: 2020-07-23

## 2020-07-23 RX ORDER — ASCORBIC ACID 60 MG
500 TABLET,CHEWABLE ORAL
Refills: 0 | Status: DISCONTINUED | OUTPATIENT
Start: 2020-07-23 | End: 2020-07-26

## 2020-07-23 RX ORDER — ACETAMINOPHEN 500 MG
1000 TABLET ORAL ONCE
Refills: 0 | Status: COMPLETED | OUTPATIENT
Start: 2020-07-23 | End: 2020-07-23

## 2020-07-23 RX ORDER — OXYCODONE AND ACETAMINOPHEN 5; 325 MG/1; MG/1
1 TABLET ORAL EVERY 4 HOURS
Refills: 0 | Status: DISCONTINUED | OUTPATIENT
Start: 2020-07-23 | End: 2020-07-23

## 2020-07-23 RX ORDER — ONDANSETRON 8 MG/1
4 TABLET, FILM COATED ORAL EVERY 6 HOURS
Refills: 0 | Status: DISCONTINUED | OUTPATIENT
Start: 2020-07-23 | End: 2020-07-26

## 2020-07-23 RX ORDER — DEXTROSE 50 % IN WATER 50 %
15 SYRINGE (ML) INTRAVENOUS ONCE
Refills: 0 | Status: DISCONTINUED | OUTPATIENT
Start: 2020-07-23 | End: 2020-07-26

## 2020-07-23 RX ORDER — OXYCODONE AND ACETAMINOPHEN 5; 325 MG/1; MG/1
2 TABLET ORAL EVERY 6 HOURS
Refills: 0 | Status: DISCONTINUED | OUTPATIENT
Start: 2020-07-23 | End: 2020-07-23

## 2020-07-23 RX ORDER — DEXTROSE 50 % IN WATER 50 %
25 SYRINGE (ML) INTRAVENOUS ONCE
Refills: 0 | Status: DISCONTINUED | OUTPATIENT
Start: 2020-07-23 | End: 2020-07-26

## 2020-07-23 RX ORDER — INSULIN LISPRO 100/ML
VIAL (ML) SUBCUTANEOUS
Refills: 0 | Status: DISCONTINUED | OUTPATIENT
Start: 2020-07-23 | End: 2020-07-26

## 2020-07-23 RX ORDER — SODIUM CHLORIDE 9 MG/ML
1000 INJECTION, SOLUTION INTRAVENOUS
Refills: 0 | Status: DISCONTINUED | OUTPATIENT
Start: 2020-07-23 | End: 2020-07-23

## 2020-07-23 RX ORDER — CEFAZOLIN SODIUM 1 G
2000 VIAL (EA) INJECTION EVERY 8 HOURS
Refills: 0 | Status: COMPLETED | OUTPATIENT
Start: 2020-07-23 | End: 2020-07-24

## 2020-07-23 RX ORDER — FOLIC ACID 0.8 MG
1 TABLET ORAL DAILY
Refills: 0 | Status: DISCONTINUED | OUTPATIENT
Start: 2020-07-23 | End: 2020-07-26

## 2020-07-23 RX ORDER — ATORVASTATIN CALCIUM 80 MG/1
20 TABLET, FILM COATED ORAL AT BEDTIME
Refills: 0 | Status: DISCONTINUED | OUTPATIENT
Start: 2020-07-23 | End: 2020-07-26

## 2020-07-23 RX ORDER — METOCLOPRAMIDE HCL 10 MG
10 TABLET ORAL EVERY 6 HOURS
Refills: 0 | Status: DISCONTINUED | OUTPATIENT
Start: 2020-07-23 | End: 2020-07-26

## 2020-07-23 RX ORDER — ONDANSETRON 8 MG/1
4 TABLET, FILM COATED ORAL ONCE
Refills: 0 | Status: DISCONTINUED | OUTPATIENT
Start: 2020-07-23 | End: 2020-07-23

## 2020-07-23 RX ORDER — HYDROMORPHONE HYDROCHLORIDE 2 MG/ML
1 INJECTION INTRAMUSCULAR; INTRAVENOUS; SUBCUTANEOUS EVERY 4 HOURS
Refills: 0 | Status: DISCONTINUED | OUTPATIENT
Start: 2020-07-23 | End: 2020-07-26

## 2020-07-23 RX ORDER — MAGNESIUM HYDROXIDE 400 MG/1
30 TABLET, CHEWABLE ORAL EVERY 12 HOURS
Refills: 0 | Status: DISCONTINUED | OUTPATIENT
Start: 2020-07-23 | End: 2020-07-26

## 2020-07-23 RX ORDER — GLUCAGON INJECTION, SOLUTION 0.5 MG/.1ML
1 INJECTION, SOLUTION SUBCUTANEOUS ONCE
Refills: 0 | Status: DISCONTINUED | OUTPATIENT
Start: 2020-07-23 | End: 2020-07-26

## 2020-07-23 RX ORDER — INSULIN GLARGINE 100 [IU]/ML
60 INJECTION, SOLUTION SUBCUTANEOUS EVERY MORNING
Refills: 0 | Status: DISCONTINUED | OUTPATIENT
Start: 2020-07-23 | End: 2020-07-26

## 2020-07-23 RX ORDER — DEXTROSE 50 % IN WATER 50 %
12.5 SYRINGE (ML) INTRAVENOUS ONCE
Refills: 0 | Status: DISCONTINUED | OUTPATIENT
Start: 2020-07-23 | End: 2020-07-26

## 2020-07-23 RX ORDER — SODIUM CHLORIDE 9 MG/ML
3 INJECTION INTRAMUSCULAR; INTRAVENOUS; SUBCUTANEOUS EVERY 8 HOURS
Refills: 0 | Status: DISCONTINUED | OUTPATIENT
Start: 2020-07-23 | End: 2020-07-23

## 2020-07-23 RX ORDER — ALPRAZOLAM 0.25 MG
1 TABLET ORAL THREE TIMES A DAY
Refills: 0 | Status: DISCONTINUED | OUTPATIENT
Start: 2020-07-23 | End: 2020-07-26

## 2020-07-23 RX ADMIN — ONDANSETRON 4 MILLIGRAM(S): 8 TABLET, FILM COATED ORAL at 22:04

## 2020-07-23 RX ADMIN — SODIUM CHLORIDE 75 MILLILITER(S): 9 INJECTION, SOLUTION INTRAVENOUS at 12:34

## 2020-07-23 RX ADMIN — HYDROMORPHONE HYDROCHLORIDE 1 MILLIGRAM(S): 2 INJECTION INTRAMUSCULAR; INTRAVENOUS; SUBCUTANEOUS at 12:41

## 2020-07-23 RX ADMIN — ATORVASTATIN CALCIUM 20 MILLIGRAM(S): 80 TABLET, FILM COATED ORAL at 21:32

## 2020-07-23 RX ADMIN — OXYCODONE HYDROCHLORIDE 10 MILLIGRAM(S): 5 TABLET ORAL at 15:28

## 2020-07-23 RX ADMIN — Medication 100 MILLIGRAM(S): at 20:47

## 2020-07-23 RX ADMIN — Medication 10 MILLIGRAM(S): at 18:14

## 2020-07-23 RX ADMIN — HYDROMORPHONE HYDROCHLORIDE 1 MILLIGRAM(S): 2 INJECTION INTRAMUSCULAR; INTRAVENOUS; SUBCUTANEOUS at 12:55

## 2020-07-23 RX ADMIN — SODIUM CHLORIDE 100 MILLILITER(S): 9 INJECTION, SOLUTION INTRAVENOUS at 17:18

## 2020-07-23 RX ADMIN — Medication 400 MILLIGRAM(S): at 23:38

## 2020-07-23 RX ADMIN — Medication 1 MILLIGRAM(S): at 22:00

## 2020-07-23 RX ADMIN — Medication 30 UNIT(S): at 17:14

## 2020-07-23 RX ADMIN — Medication 2: at 17:13

## 2020-07-23 RX ADMIN — Medication 500 MILLIGRAM(S): at 17:15

## 2020-07-23 RX ADMIN — OXYCODONE HYDROCHLORIDE 10 MILLIGRAM(S): 5 TABLET ORAL at 16:20

## 2020-07-23 NOTE — PHYSICAL THERAPY INITIAL EVALUATION ADULT - BALANCE TRAINING, PT EVAL
Patient will be normal in static and dynamic standing balance with straight cane in 2-3 days to improve safety and decrease risk of falls.

## 2020-07-23 NOTE — DISCHARGE NOTE PROVIDER - CARE PROVIDER_API CALL
Ulises Purvis  ORTHOPAEDIC SURGERY  [address]  [place] [address]  Phone: [phone]  Fax: [phone]  Follow Up Time:

## 2020-07-23 NOTE — PHYSICAL THERAPY INITIAL EVALUATION ADULT - ADDITIONAL COMMENTS
As per pt, pt lives with significant other in a mother/daughter house (main floor apartment) c 1 platform step to enter the home. Bathroom has tub/shower combination c fixed shower head and no grab bars, standard height toilet seat. Pt reports she was independent c all ADLs and ambulating c straight cane (good condition). Pt wear glasses for reading/distance, right hand dominant, hasn't driven since February 2020, and currently on disability.

## 2020-07-23 NOTE — DISCHARGE NOTE PROVIDER - NSDCFUADDINST_GEN_ALL_CORE_FT
Keep Dressing Clean, Dry and Intact. May shower on POD#5 with Dressing on. Dressing may be removed on POD#7. Please do not scrub, soak, peel or pick at the dressing. No creams, lotions, or oils over dressing. May shower on POD#5 and let water run over dressing, no baths. Pat dry once out of shower.     If dressing is saturated from border to border. Remove dressing and cover with clean dry dressing. Keep Dressing Clean, Dry and Intact. May shower on Tuesday 7/28/2020 with Dressing on. Dressing may be removed on POD#5 if dressing is dry there is no need to keep incision covered.   Please do not scrub, soak, peel or pick at the dressing. No creams, lotions, or oils over dressing or incision.    No direct water pressure over incision, no baths. Pat dry once out of shower.     If dressing is saturated from border to border. Remove dressing and cover with clean dry dressing daily as needed.     No bending/lifting/twisting/pulling/pushing/carrying or driving. No blood thinners (not limited to but including) aspirin, motrin, alleve, naproxen, etc.    LSO brace as needed for support while out of bed/ambulating.

## 2020-07-23 NOTE — PHYSICAL THERAPY INITIAL EVALUATION ADULT - GENERAL OBSERVATIONS, REHAB EVAL
Pt encountered supine in bed, A&Ox4,  at bedside, +pneumatic TEDs, +IV (removed prior to ambulation), +C/D/I dressing to Lumbar spine, +YAMILETH drain, +weaver catheter, 7/10 Lumbar spine, NAD and comfortable.

## 2020-07-23 NOTE — DISCHARGE NOTE PROVIDER - NSDCACTIVITY_GEN_ALL_CORE
No heavy lifting/straining/Showering allowed/Do not drive or operate machinery/Stairs allowed/Walking - Indoors allowed/Walking - Outdoors allowed

## 2020-07-23 NOTE — PHYSICAL THERAPY INITIAL EVALUATION ADULT - STRENGTHENING, PT EVAL
Patient will improve strength in BUE/BLE to 5/5 6-8 weeks to improve overall functional mobility including gait, transfers, bed mobility and decrease risk of falls.

## 2020-07-23 NOTE — DISCHARGE NOTE PROVIDER - HOSPITAL COURSE
55yFemale with history of Lumbar Radiculopathy presenting for TLIF L5-S1 by Dr. Purvis on 7/23/20. Risk and benefits of surgery were explained to the patient.The patient understood and agreed to proceed with surgery. Patient underwent the procedure with no intraoperative complications. Pt was brought in stable condition to the PACU. Once stable in PACU, pt was brought to the floor. During hospital stay pt was followed by Medicine during this admission. Pt had an uneventful hospital course. Pt is stable for discharge to [rehab/home] on POD#[ ] 55yFemale with history of Lumbar Radiculopathy presenting for TLIF L5-S1 by Dr. Purvis on 7/23/20. Risk and benefits of surgery were explained to the patient.The patient understood and agreed to proceed with surgery. Patient underwent the procedure with no intraoperative complications. Pt was brought in stable condition to the PACU. Once stable in PACU, pt was brought to the floor. During hospital stay pt was followed by Medicine during this admission. Pt had an uneventful hospital course. Pt is stable for discharge to home on POD#3

## 2020-07-23 NOTE — PROGRESS NOTE ADULT - SUBJECTIVE AND OBJECTIVE BOX
Post-op Check   POD#0 S/P TLIF L5-S1  55yFemale Patient seen and examined, Pain controlled  Patient Denies SOB, CP, N/V/D       PE: L-Spine: Dressing C/D/I, YAMILETH intact, Sensation/motor intact  B/L LE: Skin intact. +ROM hip/knee/ankle/toes. Ankle Dorsi/plantarflexion: 5/5. Calf: soft, compressible and nontender. DP/PT 2+ NVI                          14.7   13.75 )-----------( 241      ( 23 Jul 2020 13:22 )             43.8       07-23    140  |  109<H>  |  12  ----------------------------<  220<H>  4.3   |  24  |  0.73    Ca    8.9      23 Jul 2020 13:22          A: As above   P: Pain Control       DVT Prophylaxis      Incentive spirometry      Monitor YAMILETH Output       Continue Deluna until ambulating with PT       PT WBAT      Isometric exercises      Discharge Planning      All the above discussed and understood by pt       Ortho to F/U

## 2020-07-23 NOTE — DISCHARGE NOTE PROVIDER - NSDCMRMEDTOKEN_GEN_ALL_CORE_FT
atorvastatin 20 mg oral tablet: 1 tab(s) orally once a day  gabapentin 600 mg oral tablet: orally 4 times a day  glimepiride 2 mg oral tablet: orally 3 times a day  HumaLOG 100 units/mL subcutaneous solution: 30  subcutaneous 3 times a day  Lantus 100 units/mL subcutaneous solution: 60  subcutaneous once a day  Lidoderm 5% topical film: Apply topically to affected area once a day (at bedtime)  Trulicity Pen 1.5 mg/0.5 mL subcutaneous solution:   Xanax 1 mg oral tablet: 1 tab(s) orally 3 times a day, As Needed acetaminophen 325 mg oral tablet: 2 tab(s) orally every 6 hours, As needed, Mild Pain (1 - 3), Moderate Pain (4 - 6), Severe Pain (7 - 10)  ascorbic acid 500 mg oral tablet: 1 tab(s) orally 2 times a day  atorvastatin 20 mg oral tablet: 1 tab(s) orally once a day  gabapentin 600 mg oral tablet: orally 4 times a day  glimepiride 2 mg oral tablet: orally 3 times a day  HumaLOG 100 units/mL subcutaneous solution: 30  subcutaneous 3 times a day  Lantus 100 units/mL subcutaneous solution: 60  subcutaneous once a day  LSO Brace: LSO brace used as needed for support  Multiple Vitamins oral tablet: 1 tab(s) orally once a day  oxyCODONE 5 mg oral tablet: 1- 2 tab(s) orally every 4 hours, As Needed -Pain  MDD:10  Trulicity Pen 1.5 mg/0.5 mL subcutaneous solution:   Xanax 1 mg oral tablet: 1 tab(s) orally 3 times a day, As Needed

## 2020-07-23 NOTE — DISCHARGE NOTE PROVIDER - NSDCFUADDAPPT_GEN_ALL_CORE_FT
Follow up with your surgeon in two weeks. Call for appointment.  If you need more pain medication, call your surgeon's office.  We Recommend a follow up appointment with your primary care physician for repeat blood work (CBC and BMP) for post hospital discharge follow-up care.  Call your surgeon if you have increased redness/pain/drainage or fever. Return to ER for shortness of breath/calf tenderness. Follow up with your surgeon in two weeks. Call for appointment.  If you need more pain medication, call your surgeon's office. For medication refills or authorizations, please call [phone]  We recommend that you call and schedule a follow up appointment with your primary care physician for repeat blood work (CBC and BMP) for post hospital discharge follow-up care.  Call your surgeon if you have increased redness/pain/drainage or fever. Return to ER for shortness of breath/calf tenderness..

## 2020-07-23 NOTE — CONSULT NOTE ADULT - SUBJECTIVE AND OBJECTIVE BOX
DESIRAE CHAMBERS is a 55y Female s/p TRANSFORAMINAL LUMBAR INTERBODY FUSION L5-S1 WITH IMAGE  by Dr. Purvis on 7/23/20. complains of postop pain; patient tolerated surgery well.    PMH: w/ h/o Carcinoma of nasal tip  Diabetes  No pertinent past medical history    ROS: no fevers, chills, headache, dizziness, lightheadedness, chest pain, palpitations, shortness of breath, cough, phlegm, wheezing, abdominal pain, nausea, vomiting, diarrhea, constipation or urinary symptoms     PSH: History of lumbar laminectomy  S/P cholecystectomy    SH: does not smoke or drink at this time    No Known Allergies    MEDS: ALPRAZolam 1 milliGRAM(s) Oral three times a day PRN  ascorbic acid 500 milliGRAM(s) Oral two times a day  atorvastatin 20 milliGRAM(s) Oral at bedtime  ceFAZolin   IVPB 2000 milliGRAM(s) IV Intermittent every 8 hours  dextrose 40% Gel 15 Gram(s) Oral once PRN  dextrose 5%. 1000 milliLiter(s) IV Continuous <Continuous>  dextrose 50% Injectable 12.5 Gram(s) IV Push once  dextrose 50% Injectable 25 Gram(s) IV Push once  dextrose 50% Injectable 25 Gram(s) IV Push once  folic acid 1 milliGRAM(s) Oral daily  glucagon  Injectable 1 milliGRAM(s) IntraMuscular once PRN  HYDROmorphone  Injectable 1 milliGRAM(s) IV Push every 4 hours PRN  insulin glargine Injectable (LANTUS) 60 Unit(s) SubCutaneous at bedtime  insulin lispro (HumaLOG) corrective regimen sliding scale   SubCutaneous three times a day before meals  insulin lispro Injectable (HumaLOG) 30 Unit(s) SubCutaneous before dinner  07-23    140  |  109<H>  |  12  ----------------------------<  220<H>  4.3   |  24  |  0.73    Ca    8.9      23 Jul 2020 13:22    lactated ringers. 1000 milliLiter(s) IV Continuous <Continuous>  magnesium hydroxide Suspension 30 milliLiter(s) Oral every 12 hours PRN  metoclopramide Injectable 10 milliGRAM(s) IV Push every 6 hours PRN  multivitamin 1 Tablet(s) Oral daily  oxyCODONE    IR 5 milliGRAM(s) Oral every 4 hours PRN  oxyCODONE    IR 10 milliGRAM(s) Oral every 4 hours PRN    PHYS: T(C): 36.7 (07-23-20 @ 16:12), Max: 36.8 (07-23-20 @ 14:10)  HR: 79 (07-23-20 @ 17:03) (79 - 95)  BP: 155/81 (07-23-20 @ 17:03) (110/69 - 155/81)  RR: 18 (07-23-20 @ 17:03) (12 - 18)  SpO2: 96% (07-23-20 @ 17:03) (92% - 100%)  HEENT unremarkable  neck no JVD or bruit  lungs, clear bilaterally  heart, regular rhythm, normal S1, S2, no murmurs, rubs or gallops  abdomen, soft, non tender, no organomegaly, normal bowel sounds  no cyanosis, clubbing, edema or calf tenderness  neuro, grossly normal                        14.7   13.75 )-----------( 241      ( 23 Jul 2020 13:22 )             43.8     Assessment and Plan: lumbar radiculopathy status post TLIF L5-S1; diabetes mellitus (A1c=6.3); hyperlipidemia; postop leucocytosis; pain control; deep vein thrombophlebitis prophylaxis; physical therapy; bowel regimen; nutrition support; follow up labs; will follow.

## 2020-07-23 NOTE — PHYSICAL THERAPY INITIAL EVALUATION ADULT - GAIT TRAINING, PT EVAL
Patient will ambulate 500 feet with straight cane independently for community ambulation in 2-3 days. Patient will ascend/descend 1 steps without rails and straight cane independently in 2-3 days to safely navigate home environment.

## 2020-07-23 NOTE — PATIENT PROFILE ADULT - NSPROSPHOSPCHAPLAINYN_GEN_A_NUR
PRINCIPAL DISCHARGE DIAGNOSIS  Diagnosis: Desmoplastic nodular medulloblastoma  Assessment and Plan of Treatment:
no

## 2020-07-23 NOTE — PHYSICAL THERAPY INITIAL EVALUATION ADULT - CRITERIA FOR SKILLED THERAPEUTIC INTERVENTIONS
risk reduction/prevention/anticipated discharge recommendation/impairments found/functional limitations in following categories

## 2020-07-24 LAB
ANION GAP SERPL CALC-SCNC: 5 MMOL/L — SIGNIFICANT CHANGE UP (ref 5–17)
BASOPHILS # BLD AUTO: 0.02 K/UL — SIGNIFICANT CHANGE UP (ref 0–0.2)
BASOPHILS NFR BLD AUTO: 0.1 % — SIGNIFICANT CHANGE UP (ref 0–2)
BUN SERPL-MCNC: 10 MG/DL — SIGNIFICANT CHANGE UP (ref 7–23)
CALCIUM SERPL-MCNC: 8.8 MG/DL — SIGNIFICANT CHANGE UP (ref 8.5–10.1)
CHLORIDE SERPL-SCNC: 108 MMOL/L — SIGNIFICANT CHANGE UP (ref 96–108)
CO2 SERPL-SCNC: 25 MMOL/L — SIGNIFICANT CHANGE UP (ref 22–31)
CREAT SERPL-MCNC: 0.66 MG/DL — SIGNIFICANT CHANGE UP (ref 0.5–1.3)
EOSINOPHIL # BLD AUTO: 0.04 K/UL — SIGNIFICANT CHANGE UP (ref 0–0.5)
EOSINOPHIL NFR BLD AUTO: 0.2 % — SIGNIFICANT CHANGE UP (ref 0–6)
GLUCOSE BLDC GLUCOMTR-MCNC: 140 MG/DL — HIGH (ref 70–99)
GLUCOSE BLDC GLUCOMTR-MCNC: 159 MG/DL — HIGH (ref 70–99)
GLUCOSE BLDC GLUCOMTR-MCNC: 220 MG/DL — HIGH (ref 70–99)
GLUCOSE BLDC GLUCOMTR-MCNC: 58 MG/DL — LOW (ref 70–99)
GLUCOSE BLDC GLUCOMTR-MCNC: 78 MG/DL — SIGNIFICANT CHANGE UP (ref 70–99)
GLUCOSE BLDC GLUCOMTR-MCNC: 90 MG/DL — SIGNIFICANT CHANGE UP (ref 70–99)
GLUCOSE SERPL-MCNC: 156 MG/DL — HIGH (ref 70–99)
HCT VFR BLD CALC: 38.2 % — SIGNIFICANT CHANGE UP (ref 34.5–45)
HGB BLD-MCNC: 12.9 G/DL — SIGNIFICANT CHANGE UP (ref 11.5–15.5)
IMM GRANULOCYTES NFR BLD AUTO: 0.8 % — SIGNIFICANT CHANGE UP (ref 0–1.5)
LYMPHOCYTES # BLD AUTO: 15.1 % — SIGNIFICANT CHANGE UP (ref 13–44)
LYMPHOCYTES # BLD AUTO: 3.07 K/UL — SIGNIFICANT CHANGE UP (ref 1–3.3)
MCHC RBC-ENTMCNC: 29.5 PG — SIGNIFICANT CHANGE UP (ref 27–34)
MCHC RBC-ENTMCNC: 33.8 GM/DL — SIGNIFICANT CHANGE UP (ref 32–36)
MCV RBC AUTO: 87.4 FL — SIGNIFICANT CHANGE UP (ref 80–100)
MONOCYTES # BLD AUTO: 1.21 K/UL — HIGH (ref 0–0.9)
MONOCYTES NFR BLD AUTO: 5.9 % — SIGNIFICANT CHANGE UP (ref 2–14)
NEUTROPHILS # BLD AUTO: 15.85 K/UL — HIGH (ref 1.8–7.4)
NEUTROPHILS NFR BLD AUTO: 77.9 % — HIGH (ref 43–77)
NRBC # BLD: 0 /100 WBCS — SIGNIFICANT CHANGE UP (ref 0–0)
PLATELET # BLD AUTO: 225 K/UL — SIGNIFICANT CHANGE UP (ref 150–400)
POTASSIUM SERPL-MCNC: 4 MMOL/L — SIGNIFICANT CHANGE UP (ref 3.5–5.3)
POTASSIUM SERPL-SCNC: 4 MMOL/L — SIGNIFICANT CHANGE UP (ref 3.5–5.3)
RBC # BLD: 4.37 M/UL — SIGNIFICANT CHANGE UP (ref 3.8–5.2)
RBC # FLD: 14 % — SIGNIFICANT CHANGE UP (ref 10.3–14.5)
SODIUM SERPL-SCNC: 138 MMOL/L — SIGNIFICANT CHANGE UP (ref 135–145)
WBC # BLD: 20.35 K/UL — HIGH (ref 3.8–10.5)
WBC # FLD AUTO: 20.35 K/UL — HIGH (ref 3.8–10.5)

## 2020-07-24 RX ORDER — CEFAZOLIN SODIUM 1 G
2000 VIAL (EA) INJECTION EVERY 8 HOURS
Refills: 0 | Status: DISCONTINUED | OUTPATIENT
Start: 2020-07-24 | End: 2020-07-26

## 2020-07-24 RX ADMIN — Medication 1: at 11:39

## 2020-07-24 RX ADMIN — OXYCODONE HYDROCHLORIDE 5 MILLIGRAM(S): 5 TABLET ORAL at 16:05

## 2020-07-24 RX ADMIN — Medication 30 UNIT(S): at 11:39

## 2020-07-24 RX ADMIN — OXYCODONE HYDROCHLORIDE 5 MILLIGRAM(S): 5 TABLET ORAL at 11:24

## 2020-07-24 RX ADMIN — Medication 100 MILLIGRAM(S): at 04:22

## 2020-07-24 RX ADMIN — OXYCODONE HYDROCHLORIDE 5 MILLIGRAM(S): 5 TABLET ORAL at 17:05

## 2020-07-24 RX ADMIN — Medication 1 TABLET(S): at 11:39

## 2020-07-24 RX ADMIN — Medication 100 MILLIGRAM(S): at 11:39

## 2020-07-24 RX ADMIN — Medication 30 UNIT(S): at 08:17

## 2020-07-24 RX ADMIN — ATORVASTATIN CALCIUM 20 MILLIGRAM(S): 80 TABLET, FILM COATED ORAL at 21:02

## 2020-07-24 RX ADMIN — Medication 1 MILLIGRAM(S): at 17:48

## 2020-07-24 RX ADMIN — SODIUM CHLORIDE 100 MILLILITER(S): 9 INJECTION, SOLUTION INTRAVENOUS at 21:03

## 2020-07-24 RX ADMIN — INSULIN GLARGINE 60 UNIT(S): 100 INJECTION, SOLUTION SUBCUTANEOUS at 08:17

## 2020-07-24 RX ADMIN — Medication 1000 MILLIGRAM(S): at 00:10

## 2020-07-24 RX ADMIN — Medication 500 MILLIGRAM(S): at 17:48

## 2020-07-24 RX ADMIN — Medication 100 MILLIGRAM(S): at 20:44

## 2020-07-24 RX ADMIN — Medication 500 MILLIGRAM(S): at 06:33

## 2020-07-24 RX ADMIN — Medication 2: at 08:18

## 2020-07-24 RX ADMIN — OXYCODONE HYDROCHLORIDE 5 MILLIGRAM(S): 5 TABLET ORAL at 10:24

## 2020-07-24 RX ADMIN — ONDANSETRON 4 MILLIGRAM(S): 8 TABLET, FILM COATED ORAL at 06:33

## 2020-07-24 RX ADMIN — Medication 1 MILLIGRAM(S): at 11:39

## 2020-07-24 NOTE — PROGRESS NOTE ADULT - SUBJECTIVE AND OBJECTIVE BOX
DESIRAE CHAMBERS is a 55y Female s/p TRANSFORAMINAL LUMBAR INTERBODY FUSION L5-S1 WITH IMAGE  by Dr. Purvis on 7/23/20. complains of postop pain; patient tolerated surgery well.    ROS: no pulmonary, cardiovascular, gastrointestinal, urological or neurological symptoms.     PHYS: T(C): 37 (07-24-20 @ 05:00), Max: 37.1 (07-24-20 @ 01:00)  HR: 77 (07-24-20 @ 05:00) (69 - 95)  BP: 106/50 (07-24-20 @ 05:00) (106/50 - 155/81)  RR: 16 (07-24-20 @ 05:00) (12 - 18)  SpO2: 93% (07-24-20 @ 05:00) (92% - 100%)  vss; lungs, clear; heart, reg rhythm, no murmurs, rubs or gallops;   abd, soft, non tender, normal bowel sounds, no calf tenderness or edema                         12.9   20.35 )-----------( 225      ( 24 Jul 2020 06:39 )             38.2   07-23    140  |  109<H>  |  12  ----------------------------<  220<H>  4.3   |  24  |  0.73    Ca    8.9      23 Jul 2020 13:22    Assessment and Plan: lumbar radiculopathy status post TLIF L5-S1; diabetes mellitus (A1c=6.3); hyperlipidemia; postop leucocytosis; pain control; deep vein thrombophlebitis prophylaxis; physical therapy; bowel regimen; nutrition support; follow up labs; will follow.

## 2020-07-24 NOTE — PROGRESS NOTE ADULT - SUBJECTIVE AND OBJECTIVE BOX
55yFemale s/p TLIF L5-S1 . POD #1  Pt seen and examined in NAD.   Pain controlled.  Tolerating diet  Pt denies any new complaints.   Pt denies CP/SOB/N/V/D/numbness/tingling/bowel or bladder dysfunction.     Vital Signs Last 24 Hrs  T(C): 37 (24 Jul 2020 05:00), Max: 37.1 (24 Jul 2020 01:00)  T(F): 98.6 (24 Jul 2020 05:00), Max: 98.7 (24 Jul 2020 01:00)  HR: 77 (24 Jul 2020 09:55) (69 - 95)  BP: 121/59 (24 Jul 2020 09:55) (106/50 - 155/81)  BP(mean): --  RR: 18 (24 Jul 2020 09:55) (12 - 18)  SpO2: 96% (24 Jul 2020 09:55) (92% - 100%)    PE:   General: A&Ox3, NAD  Spine: Dressing c/d/i  +YAMILETH    B/L LE: Skin intact. +ROM hip/knee/ankle/toes. Ankle Dorsi/plantarflexion: 5/5. Calf: soft, compressible and nontender. DP/PT 2+ NVI.                             12.9   20.35 )-----------( 225      ( 24 Jul 2020 06:39 )             38.2       07-24    138  |  108  |  10  ----------------------------<  156<H>  4.0   |  25  |  0.66    Ca    8.8      24 Jul 2020 06:39      A/P: 55yFemale s/p TLIF L5-S1 POD #1  Wound care  Deluna   Pain controlled  PT: WBAT: Spinal precautions  Isometric exercises  DVT ppx: SCDs  Incentive spirometry counseled   Discharge: planning   All the above discussed and understood by pt 55yFemale s/p TLIF L5-S1 . POD #1  Pt seen and examined in NAD.   Pain controlled.  Tolerating diet  Pt denies any new complaints.   Pt denies CP/SOB/N/V/D/numbness/tingling/bowel or bladder dysfunction.     Vital Signs Last 24 Hrs  T(C): 37 (24 Jul 2020 05:00), Max: 37.1 (24 Jul 2020 01:00)  T(F): 98.6 (24 Jul 2020 05:00), Max: 98.7 (24 Jul 2020 01:00)  HR: 77 (24 Jul 2020 09:55) (69 - 95)  BP: 121/59 (24 Jul 2020 09:55) (106/50 - 155/81)  BP(mean): --  RR: 18 (24 Jul 2020 09:55) (12 - 18)  SpO2: 96% (24 Jul 2020 09:55) (92% - 100%)    PE:   General: A&Ox3, NAD  Spine: Dressing c/d/i  +YAMILETH    B/L LE: Skin intact. +ROM hip/knee/ankle/toes. Ankle Dorsi/plantarflexion: 5/5. Calf: soft, compressible and nontender. DP/PT 2+ NVI.   : Deluna in place                            12.9   20.35 )-----------( 225      ( 24 Jul 2020 06:39 )             38.2       07-24    138  |  108  |  10  ----------------------------<  156<H>  4.0   |  25  |  0.66    Ca    8.8      24 Jul 2020 06:39      A/P: 55yFemale s/p TLIF L5-S1 POD #1  Wound care  Mikie DC'd  YAMILETH Drain: monitor output; continuous ABX  Pain controlled  PT: WBAT: Spinal precautions  LSO brace ordered at patients request  Isometric exercises  DVT ppx: SCDs  Incentive spirometry counseled   Discharge: planning when drains are removed  All the above discussed and understood by pt

## 2020-07-25 LAB
ANION GAP SERPL CALC-SCNC: 6 MMOL/L — SIGNIFICANT CHANGE UP (ref 5–17)
BASOPHILS # BLD AUTO: 0.04 K/UL — SIGNIFICANT CHANGE UP (ref 0–0.2)
BASOPHILS NFR BLD AUTO: 0.3 % — SIGNIFICANT CHANGE UP (ref 0–2)
BUN SERPL-MCNC: 11 MG/DL — SIGNIFICANT CHANGE UP (ref 7–23)
CALCIUM SERPL-MCNC: 8.4 MG/DL — LOW (ref 8.5–10.1)
CHLORIDE SERPL-SCNC: 103 MMOL/L — SIGNIFICANT CHANGE UP (ref 96–108)
CO2 SERPL-SCNC: 27 MMOL/L — SIGNIFICANT CHANGE UP (ref 22–31)
CREAT SERPL-MCNC: 0.62 MG/DL — SIGNIFICANT CHANGE UP (ref 0.5–1.3)
EOSINOPHIL # BLD AUTO: 0.19 K/UL — SIGNIFICANT CHANGE UP (ref 0–0.5)
EOSINOPHIL NFR BLD AUTO: 1.2 % — SIGNIFICANT CHANGE UP (ref 0–6)
GLUCOSE BLDC GLUCOMTR-MCNC: 105 MG/DL — HIGH (ref 70–99)
GLUCOSE BLDC GLUCOMTR-MCNC: 105 MG/DL — HIGH (ref 70–99)
GLUCOSE BLDC GLUCOMTR-MCNC: 112 MG/DL — HIGH (ref 70–99)
GLUCOSE BLDC GLUCOMTR-MCNC: 201 MG/DL — HIGH (ref 70–99)
GLUCOSE BLDC GLUCOMTR-MCNC: 51 MG/DL — LOW (ref 70–99)
GLUCOSE BLDC GLUCOMTR-MCNC: 78 MG/DL — SIGNIFICANT CHANGE UP (ref 70–99)
GLUCOSE BLDC GLUCOMTR-MCNC: 78 MG/DL — SIGNIFICANT CHANGE UP (ref 70–99)
GLUCOSE SERPL-MCNC: 173 MG/DL — HIGH (ref 70–99)
HCT VFR BLD CALC: 37.8 % — SIGNIFICANT CHANGE UP (ref 34.5–45)
HGB BLD-MCNC: 12.7 G/DL — SIGNIFICANT CHANGE UP (ref 11.5–15.5)
IMM GRANULOCYTES NFR BLD AUTO: 0.7 % — SIGNIFICANT CHANGE UP (ref 0–1.5)
LYMPHOCYTES # BLD AUTO: 26.9 % — SIGNIFICANT CHANGE UP (ref 13–44)
LYMPHOCYTES # BLD AUTO: 4.15 K/UL — HIGH (ref 1–3.3)
MCHC RBC-ENTMCNC: 29.4 PG — SIGNIFICANT CHANGE UP (ref 27–34)
MCHC RBC-ENTMCNC: 33.6 GM/DL — SIGNIFICANT CHANGE UP (ref 32–36)
MCV RBC AUTO: 87.5 FL — SIGNIFICANT CHANGE UP (ref 80–100)
MONOCYTES # BLD AUTO: 1.35 K/UL — HIGH (ref 0–0.9)
MONOCYTES NFR BLD AUTO: 8.7 % — SIGNIFICANT CHANGE UP (ref 2–14)
NEUTROPHILS # BLD AUTO: 9.6 K/UL — HIGH (ref 1.8–7.4)
NEUTROPHILS NFR BLD AUTO: 62.2 % — SIGNIFICANT CHANGE UP (ref 43–77)
NRBC # BLD: 0 /100 WBCS — SIGNIFICANT CHANGE UP (ref 0–0)
PLATELET # BLD AUTO: 204 K/UL — SIGNIFICANT CHANGE UP (ref 150–400)
POTASSIUM SERPL-MCNC: 3.6 MMOL/L — SIGNIFICANT CHANGE UP (ref 3.5–5.3)
POTASSIUM SERPL-SCNC: 3.6 MMOL/L — SIGNIFICANT CHANGE UP (ref 3.5–5.3)
RBC # BLD: 4.32 M/UL — SIGNIFICANT CHANGE UP (ref 3.8–5.2)
RBC # FLD: 14.5 % — SIGNIFICANT CHANGE UP (ref 10.3–14.5)
SODIUM SERPL-SCNC: 136 MMOL/L — SIGNIFICANT CHANGE UP (ref 135–145)
WBC # BLD: 15.44 K/UL — HIGH (ref 3.8–10.5)
WBC # FLD AUTO: 15.44 K/UL — HIGH (ref 3.8–10.5)

## 2020-07-25 RX ADMIN — Medication 100 MILLIGRAM(S): at 11:30

## 2020-07-25 RX ADMIN — Medication 500 MILLIGRAM(S): at 18:41

## 2020-07-25 RX ADMIN — OXYCODONE HYDROCHLORIDE 5 MILLIGRAM(S): 5 TABLET ORAL at 21:59

## 2020-07-25 RX ADMIN — Medication 30 UNIT(S): at 13:23

## 2020-07-25 RX ADMIN — OXYCODONE HYDROCHLORIDE 5 MILLIGRAM(S): 5 TABLET ORAL at 08:03

## 2020-07-25 RX ADMIN — Medication 1 TABLET(S): at 11:30

## 2020-07-25 RX ADMIN — OXYCODONE HYDROCHLORIDE 5 MILLIGRAM(S): 5 TABLET ORAL at 22:50

## 2020-07-25 RX ADMIN — INSULIN GLARGINE 60 UNIT(S): 100 INJECTION, SOLUTION SUBCUTANEOUS at 09:19

## 2020-07-25 RX ADMIN — Medication 100 MILLIGRAM(S): at 20:45

## 2020-07-25 RX ADMIN — OXYCODONE HYDROCHLORIDE 5 MILLIGRAM(S): 5 TABLET ORAL at 07:03

## 2020-07-25 RX ADMIN — SODIUM CHLORIDE 100 MILLILITER(S): 9 INJECTION, SOLUTION INTRAVENOUS at 21:59

## 2020-07-25 RX ADMIN — OXYCODONE HYDROCHLORIDE 5 MILLIGRAM(S): 5 TABLET ORAL at 12:30

## 2020-07-25 RX ADMIN — Medication 2: at 08:41

## 2020-07-25 RX ADMIN — OXYCODONE HYDROCHLORIDE 5 MILLIGRAM(S): 5 TABLET ORAL at 11:30

## 2020-07-25 RX ADMIN — OXYCODONE HYDROCHLORIDE 5 MILLIGRAM(S): 5 TABLET ORAL at 15:22

## 2020-07-25 RX ADMIN — Medication 1 MILLIGRAM(S): at 11:30

## 2020-07-25 RX ADMIN — ATORVASTATIN CALCIUM 20 MILLIGRAM(S): 80 TABLET, FILM COATED ORAL at 21:59

## 2020-07-25 RX ADMIN — Medication 500 MILLIGRAM(S): at 06:10

## 2020-07-25 RX ADMIN — Medication 1 MILLIGRAM(S): at 18:42

## 2020-07-25 RX ADMIN — Medication 100 MILLIGRAM(S): at 03:52

## 2020-07-25 RX ADMIN — Medication 30 UNIT(S): at 08:42

## 2020-07-25 RX ADMIN — SODIUM CHLORIDE 100 MILLILITER(S): 9 INJECTION, SOLUTION INTRAVENOUS at 06:09

## 2020-07-25 RX ADMIN — OXYCODONE HYDROCHLORIDE 5 MILLIGRAM(S): 5 TABLET ORAL at 16:22

## 2020-07-25 NOTE — PROGRESS NOTE ADULT - SUBJECTIVE AND OBJECTIVE BOX
55yFemale s/p TLIF L5-S1 POD #1  Pt seen and examined in NAD.   Pain controlled.  Pt denies any new complaints.   Pt denies CP/SOB/N/V/D/numbness/tingling/bowel or bladder dysfunction.     Vital Signs Last 24 Hrs  T(C): 37.1 (25 Jul 2020 04:00), Max: 37.3 (24 Jul 2020 20:32)  T(F): 98.8 (25 Jul 2020 04:00), Max: 99.2 (24 Jul 2020 23:00)  HR: 103 (25 Jul 2020 04:00) (74 - 103)  BP: 114/66 (25 Jul 2020 04:00) (114/46 - 143/71)  BP(mean): --  RR: 18 (25 Jul 2020 04:00) (17 - 18)  SpO2: 92% (25 Jul 2020 04:00) (92% - 98%)    PE:   General: A&Oxs, NAD  Spine: Dressing c/d/i +YAMILETH   B/L UE: Skin intact. +ROM shoulder/elbow/wrist/fingers. +ok/thumbsup/fingercross signs.  strength: 5/5.  RP2+ NVI.   B/L LE: Skin intact. +ROM hip/knee/ankle/toes. Ankle Dorsi/plantarflexion: 5/5. Calf: soft, compressible and nontender. DP/PT 2+ NVI.                             12.7   15.44 )-----------( 204      ( 25 Jul 2020 06:40 )             37.8       07-25    136  |  103  |  11  ----------------------------<  173<H>  3.6   |  27  |  0.62    Ca    8.4<L>      25 Jul 2020 06:40      A/P: 55yFemale s/p TLIF L5-S1 POD #1  Wound care  YAMILETH draining, monitor output  continuous ABX while drain in  Pain controlled  PT: WBAT: Spinal precautions  Isometric exercises  DVT ppx: SCDs  Incentive spirometry counseled   Discharge: planning   All the above discussed and understood by pt 55yFemale s/p TLIF L5-S1 POD #2  Pt seen and examined in NAD.   Pain controlled.  Pt denies any new complaints.   Pt denies CP/SOB/N/V/D/numbness/tingling/bowel or bladder dysfunction.     Vital Signs Last 24 Hrs  T(C): 37.1 (25 Jul 2020 04:00), Max: 37.3 (24 Jul 2020 20:32)  T(F): 98.8 (25 Jul 2020 04:00), Max: 99.2 (24 Jul 2020 23:00)  HR: 103 (25 Jul 2020 04:00) (74 - 103)  BP: 114/66 (25 Jul 2020 04:00) (114/46 - 143/71)  BP(mean): --  RR: 18 (25 Jul 2020 04:00) (17 - 18)  SpO2: 92% (25 Jul 2020 04:00) (92% - 98%)    PE:   General: A&Oxs, NAD  Spine: Dressing c/d/i +YAMILETH   B/L UE: Skin intact. +ROM shoulder/elbow/wrist/fingers. +ok/thumbsup/fingercross signs.  strength: 5/5.  RP2+ NVI.   B/L LE: Skin intact. +ROM hip/knee/ankle/toes. Ankle Dorsi/plantarflexion: 5/5. Calf: soft, compressible and nontender. DP/PT 2+ NVI.                             12.7   15.44 )-----------( 204      ( 25 Jul 2020 06:40 )             37.8       07-25    136  |  103  |  11  ----------------------------<  173<H>  3.6   |  27  |  0.62    Ca    8.4<L>      25 Jul 2020 06:40      A/P: 55yFemale s/p TLIF L5-S1 POD #2  Wound care  YAMILETH draining, monitor output  continuous ABX while drain in  Pain controlled  PT: WBAT: Spinal precautions  Isometric exercises  DVT ppx: SCDs  Incentive spirometry counseled   Discharge: planning when drains out  All the above discussed and understood by pt   Plan discussed with attending

## 2020-07-26 ENCOUNTER — TRANSCRIPTION ENCOUNTER (OUTPATIENT)
Age: 55
End: 2020-07-26

## 2020-07-26 VITALS
RESPIRATION RATE: 18 BRPM | TEMPERATURE: 98 F | HEART RATE: 88 BPM | SYSTOLIC BLOOD PRESSURE: 114 MMHG | OXYGEN SATURATION: 95 % | DIASTOLIC BLOOD PRESSURE: 69 MMHG

## 2020-07-26 LAB
ANION GAP SERPL CALC-SCNC: 7 MMOL/L — SIGNIFICANT CHANGE UP (ref 5–17)
BASOPHILS # BLD AUTO: 0.05 K/UL — SIGNIFICANT CHANGE UP (ref 0–0.2)
BASOPHILS NFR BLD AUTO: 0.4 % — SIGNIFICANT CHANGE UP (ref 0–2)
BUN SERPL-MCNC: 9 MG/DL — SIGNIFICANT CHANGE UP (ref 7–23)
CALCIUM SERPL-MCNC: 8.8 MG/DL — SIGNIFICANT CHANGE UP (ref 8.5–10.1)
CHLORIDE SERPL-SCNC: 102 MMOL/L — SIGNIFICANT CHANGE UP (ref 96–108)
CO2 SERPL-SCNC: 27 MMOL/L — SIGNIFICANT CHANGE UP (ref 22–31)
CREAT SERPL-MCNC: 0.61 MG/DL — SIGNIFICANT CHANGE UP (ref 0.5–1.3)
EOSINOPHIL # BLD AUTO: 0.31 K/UL — SIGNIFICANT CHANGE UP (ref 0–0.5)
EOSINOPHIL NFR BLD AUTO: 2.5 % — SIGNIFICANT CHANGE UP (ref 0–6)
GLUCOSE BLDC GLUCOMTR-MCNC: 141 MG/DL — HIGH (ref 70–99)
GLUCOSE BLDC GLUCOMTR-MCNC: 193 MG/DL — HIGH (ref 70–99)
GLUCOSE SERPL-MCNC: 99 MG/DL — SIGNIFICANT CHANGE UP (ref 70–99)
HCT VFR BLD CALC: 37.2 % — SIGNIFICANT CHANGE UP (ref 34.5–45)
HGB BLD-MCNC: 12.6 G/DL — SIGNIFICANT CHANGE UP (ref 11.5–15.5)
IMM GRANULOCYTES NFR BLD AUTO: 0.5 % — SIGNIFICANT CHANGE UP (ref 0–1.5)
LYMPHOCYTES # BLD AUTO: 3.86 K/UL — HIGH (ref 1–3.3)
LYMPHOCYTES # BLD AUTO: 31.7 % — SIGNIFICANT CHANGE UP (ref 13–44)
MCHC RBC-ENTMCNC: 29.6 PG — SIGNIFICANT CHANGE UP (ref 27–34)
MCHC RBC-ENTMCNC: 33.9 GM/DL — SIGNIFICANT CHANGE UP (ref 32–36)
MCV RBC AUTO: 87.5 FL — SIGNIFICANT CHANGE UP (ref 80–100)
MONOCYTES # BLD AUTO: 1.09 K/UL — HIGH (ref 0–0.9)
MONOCYTES NFR BLD AUTO: 9 % — SIGNIFICANT CHANGE UP (ref 2–14)
NEUTROPHILS # BLD AUTO: 6.8 K/UL — SIGNIFICANT CHANGE UP (ref 1.8–7.4)
NEUTROPHILS NFR BLD AUTO: 55.9 % — SIGNIFICANT CHANGE UP (ref 43–77)
NRBC # BLD: 0 /100 WBCS — SIGNIFICANT CHANGE UP (ref 0–0)
PLATELET # BLD AUTO: 204 K/UL — SIGNIFICANT CHANGE UP (ref 150–400)
POTASSIUM SERPL-MCNC: 3.9 MMOL/L — SIGNIFICANT CHANGE UP (ref 3.5–5.3)
POTASSIUM SERPL-SCNC: 3.9 MMOL/L — SIGNIFICANT CHANGE UP (ref 3.5–5.3)
RBC # BLD: 4.25 M/UL — SIGNIFICANT CHANGE UP (ref 3.8–5.2)
RBC # FLD: 14.2 % — SIGNIFICANT CHANGE UP (ref 10.3–14.5)
SODIUM SERPL-SCNC: 136 MMOL/L — SIGNIFICANT CHANGE UP (ref 135–145)
WBC # BLD: 12.17 K/UL — HIGH (ref 3.8–10.5)
WBC # FLD AUTO: 12.17 K/UL — HIGH (ref 3.8–10.5)

## 2020-07-26 RX ORDER — OXYCODONE HYDROCHLORIDE 5 MG/1
2 TABLET ORAL
Qty: 50 | Refills: 0
Start: 2020-07-26 | End: 2020-07-30

## 2020-07-26 RX ORDER — ASCORBIC ACID 60 MG
1 TABLET,CHEWABLE ORAL
Qty: 0 | Refills: 0 | DISCHARGE
Start: 2020-07-26

## 2020-07-26 RX ORDER — LIDOCAINE 4 G/100G
1 CREAM TOPICAL
Qty: 0 | Refills: 0 | DISCHARGE

## 2020-07-26 RX ORDER — POLYETHYLENE GLYCOL 3350 17 G/17G
17 POWDER, FOR SOLUTION ORAL DAILY
Refills: 0 | Status: DISCONTINUED | OUTPATIENT
Start: 2020-07-26 | End: 2020-07-26

## 2020-07-26 RX ORDER — LACTULOSE 10 G/15ML
30 SOLUTION ORAL ONCE
Refills: 0 | Status: COMPLETED | OUTPATIENT
Start: 2020-07-26 | End: 2020-07-26

## 2020-07-26 RX ORDER — ACETAMINOPHEN 500 MG
650 TABLET ORAL EVERY 6 HOURS
Refills: 0 | Status: DISCONTINUED | OUTPATIENT
Start: 2020-07-26 | End: 2020-07-26

## 2020-07-26 RX ORDER — ACETAMINOPHEN 500 MG
2 TABLET ORAL
Qty: 0 | Refills: 0 | DISCHARGE
Start: 2020-07-26

## 2020-07-26 RX ORDER — ACETAMINOPHEN 500 MG
3 TABLET ORAL
Qty: 0 | Refills: 0 | DISCHARGE
Start: 2020-07-26

## 2020-07-26 RX ADMIN — OXYCODONE HYDROCHLORIDE 5 MILLIGRAM(S): 5 TABLET ORAL at 05:14

## 2020-07-26 RX ADMIN — POLYETHYLENE GLYCOL 3350 17 GRAM(S): 17 POWDER, FOR SOLUTION ORAL at 11:12

## 2020-07-26 RX ADMIN — Medication 650 MILLIGRAM(S): at 10:19

## 2020-07-26 RX ADMIN — Medication 1 MILLIGRAM(S): at 11:11

## 2020-07-26 RX ADMIN — Medication 100 MILLIGRAM(S): at 04:46

## 2020-07-26 RX ADMIN — Medication 1 TABLET(S): at 11:12

## 2020-07-26 RX ADMIN — INSULIN GLARGINE 60 UNIT(S): 100 INJECTION, SOLUTION SUBCUTANEOUS at 11:12

## 2020-07-26 RX ADMIN — Medication 500 MILLIGRAM(S): at 05:13

## 2020-07-26 RX ADMIN — OXYCODONE HYDROCHLORIDE 5 MILLIGRAM(S): 5 TABLET ORAL at 06:10

## 2020-07-26 NOTE — DISCHARGE NOTE NURSING/CASE MANAGEMENT/SOCIAL WORK - NSDCFUADDAPPT_GEN_ALL_CORE_FT
Follow up with your surgeon in two weeks. Call for appointment.  If you need more pain medication, call your surgeon's office. For medication refills or authorizations, please call [phone]  We recommend that you call and schedule a follow up appointment with your primary care physician for repeat blood work (CBC and BMP) for post hospital discharge follow-up care.  Call your surgeon if you have increased redness/pain/drainage or fever. Return to ER for shortness of breath/calf tenderness..

## 2020-07-26 NOTE — DISCHARGE NOTE NURSING/CASE MANAGEMENT/SOCIAL WORK - PATIENT PORTAL LINK FT
You can access the FollowMyHealth Patient Portal offered by [facility] by registering at the following website: [email or web address]. By joining [employer]’s FollowMyHealth portal, you will also be able to view your health information using other applications (apps) compatible with our system.

## 2020-07-26 NOTE — PROGRESS NOTE ADULT - SUBJECTIVE AND OBJECTIVE BOX
55yFemale s/p TLIF L5-S1 POD#3. Pt seen and examined in NAD. Pain controlled. Pt denies any new complaints. Pt denies CP/SOB/N/V/D/numbness/tingling/bowel or bladder dysfunction. +BM yesterday am. Pt does not care for the hospital food here, is eating well with food brought from home.   YAMILETH: 25/85      PE:   Neuro: AAOX3  Abd: Soft, NT/ND.   Spine: Dressing stained proximal aspect with serous drainage.  +YAMILETH with scant serosanguinous.   B/L UE: Skin intact. +ROM shoulder/elbow/wrist/fingers. +ok/thumbsup/fingercross signs.  strength: 5/5.  RP2+ NVI.   B/L LE: Skin intact. +ROM hip/knee/ankle/toes. Ankle Dorsi/plantarflexion: 5/5. Calf: soft, compressible and nontender. DP/PT 2+ NVI.                             12.6   12.17 )-----------( 204      ( 26 Jul 2020 06:40 )             37.2       07-26    136  |  102  |  9   ----------------------------<  99  3.9   |  27  |  0.61    Ca    8.8      26 Jul 2020 06:40          A/P: 55yFemale s/p TLIF L5-S1 POD#3   Pain controlled  PT: WBAT - spinal precautions   DVT ppx: SCDs   Wound care, Isometric exercises, incentive spirometry   Medical consult appreciated  Discharge: planning home today pending YAMILETH drain removal.   All the above discussed and understood by pt

## 2020-08-04 DIAGNOSIS — D72.828 OTHER ELEVATED WHITE BLOOD CELL COUNT: ICD-10-CM

## 2020-08-04 DIAGNOSIS — E78.5 HYPERLIPIDEMIA, UNSPECIFIED: ICD-10-CM

## 2020-08-04 DIAGNOSIS — M51.27 OTHER INTERVERTEBRAL DISC DISPLACEMENT, LUMBOSACRAL REGION: ICD-10-CM

## 2020-08-04 DIAGNOSIS — F17.210 NICOTINE DEPENDENCE, CIGARETTES, UNCOMPLICATED: ICD-10-CM

## 2020-08-04 DIAGNOSIS — E11.9 TYPE 2 DIABETES MELLITUS WITHOUT COMPLICATIONS: ICD-10-CM

## 2020-08-04 DIAGNOSIS — L76.34 POSTPROCEDURAL SEROMA OF SKIN AND SUBCUTANEOUS TISSUE FOLLOWING OTHER PROCEDURE: ICD-10-CM

## 2020-08-04 DIAGNOSIS — M43.17 SPONDYLOLISTHESIS, LUMBOSACRAL REGION: ICD-10-CM

## 2020-08-04 DIAGNOSIS — Z85.828 PERSONAL HISTORY OF OTHER MALIGNANT NEOPLASM OF SKIN: ICD-10-CM

## 2020-08-04 DIAGNOSIS — M48.07 SPINAL STENOSIS, LUMBOSACRAL REGION: ICD-10-CM

## 2020-08-04 DIAGNOSIS — Z79.4 LONG TERM (CURRENT) USE OF INSULIN: ICD-10-CM

## 2021-06-01 ENCOUNTER — OUTPATIENT (OUTPATIENT)
Dept: OUTPATIENT SERVICES | Facility: HOSPITAL | Age: 56
LOS: 1 days | Discharge: HOME | End: 2021-06-01
Payer: COMMERCIAL

## 2021-06-01 VITALS
RESPIRATION RATE: 18 BRPM | HEIGHT: 64 IN | HEART RATE: 83 BPM | SYSTOLIC BLOOD PRESSURE: 128 MMHG | WEIGHT: 152.12 LBS | DIASTOLIC BLOOD PRESSURE: 74 MMHG | TEMPERATURE: 98 F | OXYGEN SATURATION: 98 %

## 2021-06-01 DIAGNOSIS — Z90.49 ACQUIRED ABSENCE OF OTHER SPECIFIED PARTS OF DIGESTIVE TRACT: Chronic | ICD-10-CM

## 2021-06-01 DIAGNOSIS — E78.5 HYPERLIPIDEMIA, UNSPECIFIED: ICD-10-CM

## 2021-06-01 DIAGNOSIS — Z01.818 ENCOUNTER FOR OTHER PREPROCEDURAL EXAMINATION: ICD-10-CM

## 2021-06-01 DIAGNOSIS — M54.12 RADICULOPATHY, CERVICAL REGION: ICD-10-CM

## 2021-06-01 DIAGNOSIS — Z98.890 OTHER SPECIFIED POSTPROCEDURAL STATES: Chronic | ICD-10-CM

## 2021-06-01 DIAGNOSIS — M06.9 RHEUMATOID ARTHRITIS, UNSPECIFIED: ICD-10-CM

## 2021-06-01 DIAGNOSIS — E11.9 TYPE 2 DIABETES MELLITUS WITHOUT COMPLICATIONS: ICD-10-CM

## 2021-06-01 LAB
A1C WITH ESTIMATED AVERAGE GLUCOSE RESULT: 5.5 % — SIGNIFICANT CHANGE UP (ref 4–5.6)
ANION GAP SERPL CALC-SCNC: 7 MMOL/L — SIGNIFICANT CHANGE UP (ref 5–17)
APTT BLD: 32.7 SEC — SIGNIFICANT CHANGE UP (ref 27.5–35.5)
BASOPHILS # BLD AUTO: 0.04 K/UL — SIGNIFICANT CHANGE UP (ref 0–0.2)
BASOPHILS NFR BLD AUTO: 0.5 % — SIGNIFICANT CHANGE UP (ref 0–2)
BUN SERPL-MCNC: 22 MG/DL — SIGNIFICANT CHANGE UP (ref 7–23)
CALCIUM SERPL-MCNC: 9.4 MG/DL — SIGNIFICANT CHANGE UP (ref 8.5–10.1)
CHLORIDE SERPL-SCNC: 104 MMOL/L — SIGNIFICANT CHANGE UP (ref 96–108)
CO2 SERPL-SCNC: 27 MMOL/L — SIGNIFICANT CHANGE UP (ref 22–31)
CREAT SERPL-MCNC: 0.72 MG/DL — SIGNIFICANT CHANGE UP (ref 0.5–1.3)
EOSINOPHIL # BLD AUTO: 0.17 K/UL — SIGNIFICANT CHANGE UP (ref 0–0.5)
EOSINOPHIL NFR BLD AUTO: 2 % — SIGNIFICANT CHANGE UP (ref 0–6)
ESTIMATED AVERAGE GLUCOSE: 111 MG/DL — SIGNIFICANT CHANGE UP (ref 68–114)
GLUCOSE SERPL-MCNC: 142 MG/DL — HIGH (ref 70–99)
HCG SERPL-ACNC: 3 MIU/ML — SIGNIFICANT CHANGE UP
HCT VFR BLD CALC: 41.5 % — SIGNIFICANT CHANGE UP (ref 34.5–45)
HGB BLD-MCNC: 13.8 G/DL — SIGNIFICANT CHANGE UP (ref 11.5–15.5)
IMM GRANULOCYTES NFR BLD AUTO: 0.6 % — SIGNIFICANT CHANGE UP (ref 0–1.5)
INR BLD: 0.99 RATIO — SIGNIFICANT CHANGE UP (ref 0.88–1.16)
LYMPHOCYTES # BLD AUTO: 2.76 K/UL — SIGNIFICANT CHANGE UP (ref 1–3.3)
LYMPHOCYTES # BLD AUTO: 32.4 % — SIGNIFICANT CHANGE UP (ref 13–44)
MCHC RBC-ENTMCNC: 30.4 PG — SIGNIFICANT CHANGE UP (ref 27–34)
MCHC RBC-ENTMCNC: 33.3 GM/DL — SIGNIFICANT CHANGE UP (ref 32–36)
MCV RBC AUTO: 91.4 FL — SIGNIFICANT CHANGE UP (ref 80–100)
MONOCYTES # BLD AUTO: 0.78 K/UL — SIGNIFICANT CHANGE UP (ref 0–0.9)
MONOCYTES NFR BLD AUTO: 9.2 % — SIGNIFICANT CHANGE UP (ref 2–14)
MRSA PCR RESULT.: SIGNIFICANT CHANGE UP
NEUTROPHILS # BLD AUTO: 4.72 K/UL — SIGNIFICANT CHANGE UP (ref 1.8–7.4)
NEUTROPHILS NFR BLD AUTO: 55.3 % — SIGNIFICANT CHANGE UP (ref 43–77)
NRBC # BLD: 0 /100 WBCS — SIGNIFICANT CHANGE UP (ref 0–0)
PLATELET # BLD AUTO: 219 K/UL — SIGNIFICANT CHANGE UP (ref 150–400)
POTASSIUM SERPL-MCNC: 4.2 MMOL/L — SIGNIFICANT CHANGE UP (ref 3.5–5.3)
POTASSIUM SERPL-SCNC: 4.2 MMOL/L — SIGNIFICANT CHANGE UP (ref 3.5–5.3)
PROTHROM AB SERPL-ACNC: 11.5 SEC — SIGNIFICANT CHANGE UP (ref 10.6–13.6)
RBC # BLD: 4.54 M/UL — SIGNIFICANT CHANGE UP (ref 3.8–5.2)
RBC # FLD: 14.2 % — SIGNIFICANT CHANGE UP (ref 10.3–14.5)
S AUREUS DNA NOSE QL NAA+PROBE: SIGNIFICANT CHANGE UP
SODIUM SERPL-SCNC: 138 MMOL/L — SIGNIFICANT CHANGE UP (ref 135–145)
WBC # BLD: 8.52 K/UL — SIGNIFICANT CHANGE UP (ref 3.8–10.5)
WBC # FLD AUTO: 8.52 K/UL — SIGNIFICANT CHANGE UP (ref 3.8–10.5)

## 2021-06-01 PROCEDURE — 93010 ELECTROCARDIOGRAM REPORT: CPT

## 2021-06-01 RX ORDER — GABAPENTIN 400 MG/1
0 CAPSULE ORAL
Qty: 0 | Refills: 0 | DISCHARGE

## 2021-06-01 RX ORDER — DULAGLUTIDE 4.5 MG/.5ML
0 INJECTION, SOLUTION SUBCUTANEOUS
Qty: 0 | Refills: 0 | DISCHARGE

## 2021-06-01 RX ORDER — INSULIN LISPRO 100/ML
30 VIAL (ML) SUBCUTANEOUS
Qty: 0 | Refills: 0 | DISCHARGE

## 2021-06-01 RX ORDER — GLIMEPIRIDE 1 MG
0 TABLET ORAL
Qty: 0 | Refills: 0 | DISCHARGE

## 2021-06-01 RX ORDER — ALPRAZOLAM 0.25 MG
1 TABLET ORAL
Qty: 0 | Refills: 0 | DISCHARGE

## 2021-06-01 RX ORDER — INSULIN GLARGINE 100 [IU]/ML
60 INJECTION, SOLUTION SUBCUTANEOUS
Qty: 0 | Refills: 0 | DISCHARGE

## 2021-06-01 NOTE — H&P PST ADULT - NSICDXPROBLEM_GEN_ALL_CORE_FT
PROBLEM DIAGNOSES  Problem: Cervical radiculopathy  Assessment and Plan: scheduled for anterior cervical diskectomy     Problem: DM (diabetes mellitus)  Assessment and Plan: continue meds      Problem: HLD (hyperlipidemia)  Assessment and Plan: continue meds      Problem: Rheumatoid arthritis  Assessment and Plan: continue meds      Problem: Preoperative examination  Assessment and Plan: labs - cbc,pt/ptt,bmp,t&s,nose cx,ekg  M/C required  preop 3 day hibiclens instruction reviewed and given .instructed on if  nose cx positive use mupuricin 5 days and checklist given  take routine meds DOS with sips of water. avoid NSAID and OTC supplements. verbalized understanding  information on proper nutrition , increase protein and better food choices provided in packet  patient instructed on having covid19 swab 3 days prior to surgery

## 2021-06-01 NOTE — H&P PST ADULT - ANESTHESIA, PREVIOUS REACTION, PROFILE
Name:  Neha Munroe /Age/Sex: 1951  (76 y.o. male)   MRN & CSN:  [id number] & [id number] Admission Date/Time: 2019  7:13 PM   Location:  5904/1220-Q PCP: Gill Moreau MD       Hospital Day: 11          Referring physician:  Molly Chaves MD         Reason for consultation:  PVCs        Thanks for referral.    Information source: patient    CC; Wound infection      HPI:   Thank you for involving me in taking  care of Neha Munroe who  is a 76 y. o.year  Old male  Presents infected wound , had ortho surgeries done , now having freq PVCs on minitor has no h/o CAD. Has no CP, is morbidly obese                     Past medical history:    has a past medical history of Arthritis, Asthma, Chronic kidney disease, Diabetes mellitus ([address] H/O cardiac catheterization, H/O cardiovascular stress test, Hyperlipidemia, Hypertension, Obesity, Pressure ulcer of coccygeal region, unstageable ([address] and Thyroid disease. Past surgical history:   has a past surgical history that includes Retinal detachment surgery; eye surgery; Carpal tunnel release (); Diagnostic Cardiac Cath Lab Procedure (); HEMIARTHROPLASTY HIP (Right, 2019); Pressure ulcer debridement (2019); and Pressure ulcer debridement (N/A, 2019). Social History:   reports that he has never smoked. He has never used smokeless tobacco. He reports that he does not drink alcohol or use drugs. Family history:  family history includes Cancer in his father and mother; Diabetes in his paternal grandmother; Heart Disease in his paternal grandfather; High Blood Pressure in his mother; Stroke in his maternal grandfather.     Allergies   Allergen Reactions    Bee Venom Shortness Of Breath         HYDROmorphone (DILAUDID) injection 0.5 mg Q6H PRN   0.9 % sodium chloride infusion Continuous   insulin glargine (LANTUS) injection vial 75 Units Nightly   insulin lispro (HUMALOG) injection vial 0-6 Units 2 times per day   vancomycin (VANCOCIN) 1,500 mg in dextrose 5 % 500 mL IVPB Q18H   ciprofloxacin (CIPRO) tablet 500 mg 2 times per day   collagenase ointment BID   glucose (GLUTOSE) 40 % oral gel 15 g PRN   dextrose 50 % IV solution PRN   glucagon (rDNA) injection 1 mg PRN   dextrose 5 % solution PRN   acetaminophen (TYLENOL) tablet 500 mg Q6H PRN   albuterol sulfate  (90 Base) MCG/ACT inhaler 2 puff Q4H PRN   aspirin EC tablet 81 mg Daily   mometasone-formoterol (DULERA) 200-5 MCG/ACT inhaler 2 puff BID   buPROPion (WELLBUTRIN XL) extended release tablet 300 mg Daily   canagliflozin (INVOKANA) tablet 300 mg Daily   ezetimibe (ZETIA) tablet 10 mg Daily   fluticasone (FLONASE) 50 MCG/ACT nasal spray 1 spray Daily   antioxidant multivitamin (OCUVITE) tablet Daily   nitroGLYCERIN (NITROSTAT) SL tablet 0.4 mg Q5 Min PRN   pantoprazole (PROTONIX) tablet 40 mg QAM AC   sertraline (ZOLOFT) tablet 100 mg BID   simvastatin (ZOCOR) tablet 40 mg Daily   b complex-C-E-zinc (STRESS FORMULA W/ ZINC) 1 tablet Daily   Dulaglutide SOPN 1.5 mg Weekly   vitamin C (ASCORBIC ACID) tablet 1,000 mg Daily   vitamin E capsule 400 Units Daily   sodium chloride flush 0.9 % injection 10 mL 2 times per day   sodium chloride flush 0.9 % injection 10 mL PRN   enoxaparin (LOVENOX) injection 40 mg Daily   cefepime (MAXIPIME) 2 g in dextrose 5 % 50 mL IVPB Q8H   insulin lispro (HUMALOG) injection vial 0-6 Units Nightly   traMADol (ULTRAM) tablet 50 mg Q6H PRN   Or    traMADol (ULTRAM) tablet 100 mg Q6H PRN   ondansetron (ZOFRAN) injection 4 mg Q6H PRN     Current Facility-Administered Medications   Medication Dose Route Frequency Provider Last Rate Last Dose    HYDROmorphone (DILAUDID) injection 0.5 mg  0.5 mg Intravenous Q6H PRN Jesse Jaime MD   0.5 mg at 09/29/19 0649    0.9 % sodium chloride infusion  1,000 mL Intravenous Continuous Jesse Jaime MD 50 mL/hr at 09/29/19 1228 1,000 mL at 09/29/19 1228    insulin glargine (LANTUS) injection vial 75 Daily Anita Veronica MD   1 tablet at 09/29/19 1620    nitroGLYCERIN (NITROSTAT) SL tablet 0.4 mg  0.4 mg Sublingual Q5 Min PRN Anita Veronica MD        pantoprazole (PROTONIX) tablet 40 mg  40 mg Oral QAM AC Anita Veronica MD   40 mg at 09/30/19 0617    sertraline (ZOLOFT) tablet 100 mg  100 mg Oral BID Anita Veronica MD   100 mg at 09/29/19 1614    simvastatin (ZOCOR) tablet 40 mg  40 mg Oral Daily Anita Veronica MD   40 mg at 09/29/19 2044    b complex-C-E-zinc (STRESS FORMULA W/ ZINC) 1 tablet  1 tablet Oral Daily Anita Veronica MD   1 tablet at 09/29/19 1616    Dulaglutide SOPN 1.5 mg  1.5 mg Subcutaneous Weekly Anita Veronica MD        vitamin C (ASCORBIC ACID) tablet 1,000 mg  1,000 mg Oral Daily Anita Veronica MD   1,000 mg at 09/29/19 1615    vitamin E capsule 400 Units  400 Units Oral Daily Anita Veronica MD   400 Units at 09/29/19 1615    sodium chloride flush 0.9 % injection 10 mL  10 mL Intravenous 2 times per day Anita Veronica MD   10 mL at 09/28/19 2037    sodium chloride flush 0.9 % injection 10 mL  10 mL Intravenous PRN Anita Veronica MD        enoxaparin (LOVENOX) injection 40 mg  40 mg Subcutaneous Daily Anita Veronica MD   40 mg at 09/28/19 0934    cefepime (MAXIPIME) 2 g in dextrose 5 % 50 mL IVPB  2 g Intravenous Q8H Anita Veronica MD   Stopped at 09/30/19 0053    insulin lispro (HUMALOG) injection vial 0-6 Units  0-6 Units Subcutaneous Nightly Anita Veronica MD   2 Units at 09/29/19 2049    traMADol (ULTRAM) tablet 50 mg  50 mg Oral Q6H PRN Anita Veronica MD   50 mg at 09/27/19 9372    Or    traMADol (ULTRAM) tablet 100 mg  100 mg Oral Q6H PRN Anita Veronica MD   100 mg at 09/30/19 0535    ondansetron (ZOFRAN) injection 4 mg  4 mg Intravenous Q6H PRN Anita Veronica MD   4 mg at 09/20/19 6834     Review of Systems:  All 14 systems reviewed, all negative     Physical none

## 2021-06-01 NOTE — H&P PST ADULT - ASSESSMENT
cervical radiculopathy    CAPRINI SCORE    AGE RELATED RISK FACTORS                                                       MOBILITY RELATED FACTORS  x[ ] Age 41-60 years                                            (1 Point)                  [ ] Bed rest                                                        (1 Point)  [ ] Age: 61-74 years                                           (2 Points)                [ ] Plaster cast                                                   (2 Points)  [ ] Age= 75 years                                              (3 Points)                 [ ] Bed bound for more than 72 hours                   (2 Points)    DISEASE RELATED RISK FACTORS                                               GENDER SPECIFIC FACTORS  [ ] Edema in the lower extremities                       (1 Point)                  [ ] Pregnancy                                                     (1 Point)  [ ] Varicose veins                                               (1 Point)                  [ ] Post-partum < 6 weeks                                   (1 Point)             [ x] BMI > 25 Kg/m2                                            (1 Point)                  [ ] Hormonal therapy  or oral contraception            (1 Point)                 [ ] Sepsis (in the previous month)                        (1 Point)                  [ ] History of pregnancy complications  [ ] Pneumonia or serious lung disease                                               [ ] Unexplained or recurrent                       (1 Point)           (in the previous month)                               (1 Point)  [ ] Abnormal pulmonary function test                     (1 Point)                 SURGERY RELATED RISK FACTORS  [ ] Acute myocardial infarction                              (1 Point)                 [ ]  Section                                            (1 Point)  [ ] Congestive heart failure (in the previous month)  (1 Point)                 [ ] Minor surgery                                                 (1 Point)   [ ] Inflammatory bowel disease                             (1 Point)                 [x ] Arthroscopic surgery                                        (2 Points)  [ ] Central venous access                                    (2 Points)                [ ] General surgery lasting more than 45 minutes   (2 Points)       [ ] Stroke (in the previous month)                          (5 Points)               [ ] Elective arthroplasty                                        (5 Points)                                                                                                                                               HEMATOLOGY RELATED FACTORS                                                 TRAUMA RELATED RISK FACTORS  [ ] Prior episodes of VTE                                     (3 Points)                 [ ] Fracture of the hip, pelvis, or leg                       (5 Points)  [ ] Positive family history for VTE                         (3 Points)                 [ ] Acute spinal cord injury (in the previous month)  (5 Points)  [ ] Prothrombin 85376 A                                      (3 Points)                 [ ] Paralysis  (less than 1 month)                          (5 Points)  [ ] Factor V Leiden                                             (3 Points)                 [ ] Multiple Trauma within 1 month                         (5 Points)  [ ] Lupus anticoagulants                                     (3 Points)                                                           [ ] Anticardiolipin antibodies                                (3 Points)                                                       [ ] High homocysteine in the blood                      (3 Points)                                             [ ] Other congenital or acquired thrombophilia       (3 Points)                                                [ ] Heparin induced thrombocytopenia                  (3 Points)                                          Total Score [        4  ]  Caprini Score 0-2: Low risk, No VTE Prophylaxis required for most patient's, encourage ambulation  Caprini Score 3-6: At Risk, Pharmacologic VTE prophylaxis is indicated for most patients ( in the absence of a contraindication)  Caprini Score Greater than or = 7: High Risk , pharmacologic VTE is indicated for most patients ( in the absence of a contraindication)    Caprini score indicates that the patient is high risk for VTE event ( score 6 or greater). Surgical patient's in this group will benefit from both pharmacologic prophylaxis and intermittent compression devices . Surgical team will determine the balance between VTE  risk and bleeding risk and other clinical considerations

## 2021-06-01 NOTE — H&P PST ADULT - NSICDXPASTMEDICALHX_GEN_ALL_CORE_FT
PAST MEDICAL HISTORY:  Carcinoma of nasal tip Moh surgery    Diabetes      PAST MEDICAL HISTORY:  Carcinoma of nasal tip Moh surgery    Diabetes     Rheumatoid arthritis

## 2021-06-01 NOTE — H&P PST ADULT - ATTENDING COMMENTS
cervical stenosis - indicated for acdf - failed conservative tx - r/b/e of the procedure discussed - questions answered - well informed and would like to proceed

## 2021-06-01 NOTE — H&P PST ADULT - HISTORY OF PRESENT ILLNESS
55 year old female with a past medical history of diabetes and hyperlipidemia c/o pain to her lower back that radiates down her right and left leg with numbness to her right upper thigh down to her knee.  She is scheduled for a transforaminal lumbar interbody fusion L5-S1 on 7/23/2020.     She denies fever, cough, SOB, recent travels, and sick contacts. 55 year old female with a past medical history of diabetes and hyperlipidemia c/o neck pains , not responding to conservative management secondary to radiculopathy - scheduled for anterior cervical diskectomy       She denies fever, cough, SOB, recent travels, and sick contacts.

## 2021-06-12 ENCOUNTER — APPOINTMENT (OUTPATIENT)
Dept: DISASTER EMERGENCY | Facility: CLINIC | Age: 56
End: 2021-06-12

## 2021-06-12 DIAGNOSIS — Z01.818 ENCOUNTER FOR OTHER PREPROCEDURAL EXAMINATION: ICD-10-CM

## 2021-06-13 LAB — SARS-COV-2 N GENE NPH QL NAA+PROBE: NOT DETECTED

## 2021-06-14 ENCOUNTER — TRANSCRIPTION ENCOUNTER (OUTPATIENT)
Age: 56
End: 2021-06-14

## 2021-06-15 ENCOUNTER — INPATIENT (INPATIENT)
Facility: HOSPITAL | Age: 56
LOS: 0 days | Discharge: HOME | End: 2021-06-16
Attending: ORTHOPAEDIC SURGERY | Admitting: ORTHOPAEDIC SURGERY

## 2021-06-15 ENCOUNTER — TRANSCRIPTION ENCOUNTER (OUTPATIENT)
Age: 56
End: 2021-06-15

## 2021-06-15 VITALS
OXYGEN SATURATION: 99 % | HEIGHT: 63 IN | SYSTOLIC BLOOD PRESSURE: 111 MMHG | RESPIRATION RATE: 17 BRPM | TEMPERATURE: 98 F | DIASTOLIC BLOOD PRESSURE: 63 MMHG | HEART RATE: 72 BPM | WEIGHT: 143.96 LBS

## 2021-06-15 DIAGNOSIS — Z90.49 ACQUIRED ABSENCE OF OTHER SPECIFIED PARTS OF DIGESTIVE TRACT: Chronic | ICD-10-CM

## 2021-06-15 DIAGNOSIS — Z98.890 OTHER SPECIFIED POSTPROCEDURAL STATES: Chronic | ICD-10-CM

## 2021-06-15 LAB
ANION GAP SERPL CALC-SCNC: 6 MMOL/L — SIGNIFICANT CHANGE UP (ref 5–17)
BASOPHILS # BLD AUTO: 0.03 K/UL — SIGNIFICANT CHANGE UP (ref 0–0.2)
BASOPHILS NFR BLD AUTO: 0.3 % — SIGNIFICANT CHANGE UP (ref 0–2)
BUN SERPL-MCNC: 13 MG/DL — SIGNIFICANT CHANGE UP (ref 7–23)
CALCIUM SERPL-MCNC: 8.7 MG/DL — SIGNIFICANT CHANGE UP (ref 8.5–10.1)
CHLORIDE SERPL-SCNC: 110 MMOL/L — HIGH (ref 96–108)
CO2 SERPL-SCNC: 24 MMOL/L — SIGNIFICANT CHANGE UP (ref 22–31)
CREAT SERPL-MCNC: 0.6 MG/DL — SIGNIFICANT CHANGE UP (ref 0.5–1.3)
EOSINOPHIL # BLD AUTO: 0.13 K/UL — SIGNIFICANT CHANGE UP (ref 0–0.5)
EOSINOPHIL NFR BLD AUTO: 1.2 % — SIGNIFICANT CHANGE UP (ref 0–6)
GLUCOSE BLDC GLUCOMTR-MCNC: 113 MG/DL — HIGH (ref 70–99)
GLUCOSE BLDC GLUCOMTR-MCNC: 157 MG/DL — HIGH (ref 70–99)
GLUCOSE BLDC GLUCOMTR-MCNC: 210 MG/DL — HIGH (ref 70–99)
GLUCOSE BLDC GLUCOMTR-MCNC: 216 MG/DL — HIGH (ref 70–99)
GLUCOSE SERPL-MCNC: 134 MG/DL — HIGH (ref 70–99)
HCT VFR BLD CALC: 40.5 % — SIGNIFICANT CHANGE UP (ref 34.5–45)
HGB BLD-MCNC: 13.5 G/DL — SIGNIFICANT CHANGE UP (ref 11.5–15.5)
IMM GRANULOCYTES NFR BLD AUTO: 0.4 % — SIGNIFICANT CHANGE UP (ref 0–1.5)
LYMPHOCYTES # BLD AUTO: 1.87 K/UL — SIGNIFICANT CHANGE UP (ref 1–3.3)
LYMPHOCYTES # BLD AUTO: 16.6 % — SIGNIFICANT CHANGE UP (ref 13–44)
MCHC RBC-ENTMCNC: 30.3 PG — SIGNIFICANT CHANGE UP (ref 27–34)
MCHC RBC-ENTMCNC: 33.3 GM/DL — SIGNIFICANT CHANGE UP (ref 32–36)
MCV RBC AUTO: 91 FL — SIGNIFICANT CHANGE UP (ref 80–100)
MONOCYTES # BLD AUTO: 0.38 K/UL — SIGNIFICANT CHANGE UP (ref 0–0.9)
MONOCYTES NFR BLD AUTO: 3.4 % — SIGNIFICANT CHANGE UP (ref 2–14)
NEUTROPHILS # BLD AUTO: 8.78 K/UL — HIGH (ref 1.8–7.4)
NEUTROPHILS NFR BLD AUTO: 78.1 % — HIGH (ref 43–77)
NRBC # BLD: 0 /100 WBCS — SIGNIFICANT CHANGE UP (ref 0–0)
PLATELET # BLD AUTO: 226 K/UL — SIGNIFICANT CHANGE UP (ref 150–400)
POTASSIUM SERPL-MCNC: 4.6 MMOL/L — SIGNIFICANT CHANGE UP (ref 3.5–5.3)
POTASSIUM SERPL-SCNC: 4.6 MMOL/L — SIGNIFICANT CHANGE UP (ref 3.5–5.3)
RBC # BLD: 4.45 M/UL — SIGNIFICANT CHANGE UP (ref 3.8–5.2)
RBC # FLD: 14 % — SIGNIFICANT CHANGE UP (ref 10.3–14.5)
SODIUM SERPL-SCNC: 140 MMOL/L — SIGNIFICANT CHANGE UP (ref 135–145)
WBC # BLD: 11.24 K/UL — HIGH (ref 3.8–10.5)
WBC # FLD AUTO: 11.24 K/UL — HIGH (ref 3.8–10.5)

## 2021-06-15 RX ORDER — CEFAZOLIN SODIUM 1 G
2000 VIAL (EA) INJECTION EVERY 8 HOURS
Refills: 0 | Status: DISCONTINUED | OUTPATIENT
Start: 2021-06-15 | End: 2021-06-16

## 2021-06-15 RX ORDER — HYDROMORPHONE HYDROCHLORIDE 2 MG/ML
0.5 INJECTION INTRAMUSCULAR; INTRAVENOUS; SUBCUTANEOUS
Refills: 0 | Status: DISCONTINUED | OUTPATIENT
Start: 2021-06-15 | End: 2021-06-15

## 2021-06-15 RX ORDER — DEXTROSE 50 % IN WATER 50 %
25 SYRINGE (ML) INTRAVENOUS ONCE
Refills: 0 | Status: DISCONTINUED | OUTPATIENT
Start: 2021-06-15 | End: 2021-06-16

## 2021-06-15 RX ORDER — ALPRAZOLAM 0.25 MG
0.5 TABLET ORAL ONCE
Refills: 0 | Status: DISCONTINUED | OUTPATIENT
Start: 2021-06-15 | End: 2021-06-15

## 2021-06-15 RX ORDER — HYDROMORPHONE HYDROCHLORIDE 2 MG/ML
0.5 INJECTION INTRAMUSCULAR; INTRAVENOUS; SUBCUTANEOUS EVERY 4 HOURS
Refills: 0 | Status: DISCONTINUED | OUTPATIENT
Start: 2021-06-15 | End: 2021-06-16

## 2021-06-15 RX ORDER — CYCLOBENZAPRINE HYDROCHLORIDE 10 MG/1
10 TABLET, FILM COATED ORAL EVERY 8 HOURS
Refills: 0 | Status: DISCONTINUED | OUTPATIENT
Start: 2021-06-15 | End: 2021-06-16

## 2021-06-15 RX ORDER — INSULIN GLARGINE 100 [IU]/ML
15 INJECTION, SOLUTION SUBCUTANEOUS EVERY MORNING
Refills: 0 | Status: DISCONTINUED | OUTPATIENT
Start: 2021-06-16 | End: 2021-06-16

## 2021-06-15 RX ORDER — SODIUM CHLORIDE 9 MG/ML
1000 INJECTION, SOLUTION INTRAVENOUS
Refills: 0 | Status: DISCONTINUED | OUTPATIENT
Start: 2021-06-15 | End: 2021-06-16

## 2021-06-15 RX ORDER — INSULIN GLARGINE 100 [IU]/ML
25 INJECTION, SOLUTION SUBCUTANEOUS
Qty: 0 | Refills: 0 | DISCHARGE

## 2021-06-15 RX ORDER — GLUCAGON INJECTION, SOLUTION 0.5 MG/.1ML
1 INJECTION, SOLUTION SUBCUTANEOUS ONCE
Refills: 0 | Status: DISCONTINUED | OUTPATIENT
Start: 2021-06-15 | End: 2021-06-16

## 2021-06-15 RX ORDER — METOCLOPRAMIDE HCL 10 MG
10 TABLET ORAL ONCE
Refills: 0 | Status: COMPLETED | OUTPATIENT
Start: 2021-06-15 | End: 2021-06-15

## 2021-06-15 RX ORDER — BENZOCAINE AND MENTHOL 5; 1 G/100ML; G/100ML
1 LIQUID ORAL
Refills: 0 | Status: DISCONTINUED | OUTPATIENT
Start: 2021-06-15 | End: 2021-06-16

## 2021-06-15 RX ORDER — ATORVASTATIN CALCIUM 80 MG/1
1 TABLET, FILM COATED ORAL
Qty: 0 | Refills: 0 | DISCHARGE

## 2021-06-15 RX ORDER — ACETAMINOPHEN 500 MG
975 TABLET ORAL EVERY 8 HOURS
Refills: 0 | Status: DISCONTINUED | OUTPATIENT
Start: 2021-06-15 | End: 2021-06-16

## 2021-06-15 RX ORDER — OXYCODONE HYDROCHLORIDE 5 MG/1
5 TABLET ORAL EVERY 4 HOURS
Refills: 0 | Status: DISCONTINUED | OUTPATIENT
Start: 2021-06-15 | End: 2021-06-16

## 2021-06-15 RX ORDER — SODIUM CHLORIDE 9 MG/ML
3 INJECTION INTRAMUSCULAR; INTRAVENOUS; SUBCUTANEOUS EVERY 8 HOURS
Refills: 0 | Status: DISCONTINUED | OUTPATIENT
Start: 2021-06-15 | End: 2021-06-15

## 2021-06-15 RX ORDER — DEXTROSE 50 % IN WATER 50 %
12.5 SYRINGE (ML) INTRAVENOUS ONCE
Refills: 0 | Status: DISCONTINUED | OUTPATIENT
Start: 2021-06-15 | End: 2021-06-16

## 2021-06-15 RX ORDER — OXYCODONE HYDROCHLORIDE 5 MG/1
10 TABLET ORAL EVERY 4 HOURS
Refills: 0 | Status: DISCONTINUED | OUTPATIENT
Start: 2021-06-15 | End: 2021-06-16

## 2021-06-15 RX ORDER — SENNA PLUS 8.6 MG/1
2 TABLET ORAL AT BEDTIME
Refills: 0 | Status: DISCONTINUED | OUTPATIENT
Start: 2021-06-15 | End: 2021-06-16

## 2021-06-15 RX ORDER — METOCLOPRAMIDE HCL 10 MG
10 TABLET ORAL EVERY 8 HOURS
Refills: 0 | Status: DISCONTINUED | OUTPATIENT
Start: 2021-06-15 | End: 2021-06-16

## 2021-06-15 RX ORDER — ALPRAZOLAM 0.25 MG
1 TABLET ORAL
Qty: 0 | Refills: 0 | DISCHARGE

## 2021-06-15 RX ORDER — ALPRAZOLAM 0.25 MG
1 TABLET ORAL AT BEDTIME
Refills: 0 | Status: DISCONTINUED | OUTPATIENT
Start: 2021-06-15 | End: 2021-06-16

## 2021-06-15 RX ORDER — GABAPENTIN 400 MG/1
1 CAPSULE ORAL
Qty: 0 | Refills: 0 | DISCHARGE

## 2021-06-15 RX ORDER — METOCLOPRAMIDE HCL 10 MG
10 TABLET ORAL ONCE
Refills: 0 | Status: DISCONTINUED | OUTPATIENT
Start: 2021-06-15 | End: 2021-06-15

## 2021-06-15 RX ORDER — INSULIN LISPRO 100/ML
VIAL (ML) SUBCUTANEOUS
Refills: 0 | Status: DISCONTINUED | OUTPATIENT
Start: 2021-06-15 | End: 2021-06-16

## 2021-06-15 RX ORDER — ASCORBIC ACID 60 MG
500 TABLET,CHEWABLE ORAL
Refills: 0 | Status: DISCONTINUED | OUTPATIENT
Start: 2021-06-15 | End: 2021-06-16

## 2021-06-15 RX ORDER — ATORVASTATIN CALCIUM 80 MG/1
20 TABLET, FILM COATED ORAL AT BEDTIME
Refills: 0 | Status: DISCONTINUED | OUTPATIENT
Start: 2021-06-15 | End: 2021-06-16

## 2021-06-15 RX ORDER — DULAGLUTIDE 4.5 MG/.5ML
1 INJECTION, SOLUTION SUBCUTANEOUS
Qty: 0 | Refills: 0 | DISCHARGE

## 2021-06-15 RX ORDER — DEXTROSE 50 % IN WATER 50 %
15 SYRINGE (ML) INTRAVENOUS ONCE
Refills: 0 | Status: DISCONTINUED | OUTPATIENT
Start: 2021-06-15 | End: 2021-06-16

## 2021-06-15 RX ORDER — SULFADIAZINE
1 POWDER (GRAM) MISCELLANEOUS
Qty: 0 | Refills: 0 | DISCHARGE

## 2021-06-15 RX ORDER — HYDROMORPHONE HYDROCHLORIDE 2 MG/ML
1 INJECTION INTRAMUSCULAR; INTRAVENOUS; SUBCUTANEOUS
Refills: 0 | Status: DISCONTINUED | OUTPATIENT
Start: 2021-06-15 | End: 2021-06-15

## 2021-06-15 RX ORDER — ONDANSETRON 8 MG/1
4 TABLET, FILM COATED ORAL EVERY 6 HOURS
Refills: 0 | Status: DISCONTINUED | OUTPATIENT
Start: 2021-06-15 | End: 2021-06-16

## 2021-06-15 RX ORDER — SODIUM CHLORIDE 9 MG/ML
1000 INJECTION, SOLUTION INTRAVENOUS
Refills: 0 | Status: DISCONTINUED | OUTPATIENT
Start: 2021-06-15 | End: 2021-06-15

## 2021-06-15 RX ORDER — DIPHENHYDRAMINE HCL 50 MG
12.5 CAPSULE ORAL EVERY 4 HOURS
Refills: 0 | Status: DISCONTINUED | OUTPATIENT
Start: 2021-06-15 | End: 2021-06-16

## 2021-06-15 RX ADMIN — SENNA PLUS 2 TABLET(S): 8.6 TABLET ORAL at 21:06

## 2021-06-15 RX ADMIN — SODIUM CHLORIDE 3 MILLILITER(S): 9 INJECTION INTRAMUSCULAR; INTRAVENOUS; SUBCUTANEOUS at 06:51

## 2021-06-15 RX ADMIN — Medication 10 MILLIGRAM(S): at 13:06

## 2021-06-15 RX ADMIN — ATORVASTATIN CALCIUM 20 MILLIGRAM(S): 80 TABLET, FILM COATED ORAL at 21:06

## 2021-06-15 RX ADMIN — HYDROMORPHONE HYDROCHLORIDE 1 MILLIGRAM(S): 2 INJECTION INTRAMUSCULAR; INTRAVENOUS; SUBCUTANEOUS at 11:32

## 2021-06-15 RX ADMIN — CYCLOBENZAPRINE HYDROCHLORIDE 10 MILLIGRAM(S): 10 TABLET, FILM COATED ORAL at 21:06

## 2021-06-15 RX ADMIN — BENZOCAINE AND MENTHOL 1 LOZENGE: 5; 1 LIQUID ORAL at 16:25

## 2021-06-15 RX ADMIN — SODIUM CHLORIDE 100 MILLILITER(S): 9 INJECTION, SOLUTION INTRAVENOUS at 18:31

## 2021-06-15 RX ADMIN — ONDANSETRON 4 MILLIGRAM(S): 8 TABLET, FILM COATED ORAL at 16:18

## 2021-06-15 RX ADMIN — HYDROMORPHONE HYDROCHLORIDE 1 MILLIGRAM(S): 2 INJECTION INTRAMUSCULAR; INTRAVENOUS; SUBCUTANEOUS at 11:41

## 2021-06-15 RX ADMIN — Medication 500 MILLIGRAM(S): at 18:31

## 2021-06-15 RX ADMIN — Medication 1 MILLIGRAM(S): at 21:06

## 2021-06-15 RX ADMIN — Medication 975 MILLIGRAM(S): at 16:40

## 2021-06-15 RX ADMIN — HYDROMORPHONE HYDROCHLORIDE 1 MILLIGRAM(S): 2 INJECTION INTRAMUSCULAR; INTRAVENOUS; SUBCUTANEOUS at 11:17

## 2021-06-15 RX ADMIN — Medication 2: at 16:25

## 2021-06-15 RX ADMIN — Medication 975 MILLIGRAM(S): at 22:06

## 2021-06-15 RX ADMIN — HYDROMORPHONE HYDROCHLORIDE 1 MILLIGRAM(S): 2 INJECTION INTRAMUSCULAR; INTRAVENOUS; SUBCUTANEOUS at 12:10

## 2021-06-15 RX ADMIN — SODIUM CHLORIDE 100 MILLILITER(S): 9 INJECTION, SOLUTION INTRAVENOUS at 21:06

## 2021-06-15 RX ADMIN — Medication 975 MILLIGRAM(S): at 21:06

## 2021-06-15 RX ADMIN — OXYCODONE HYDROCHLORIDE 5 MILLIGRAM(S): 5 TABLET ORAL at 16:40

## 2021-06-15 RX ADMIN — OXYCODONE HYDROCHLORIDE 10 MILLIGRAM(S): 5 TABLET ORAL at 13:41

## 2021-06-15 RX ADMIN — Medication 100 MILLIGRAM(S): at 15:41

## 2021-06-15 RX ADMIN — OXYCODONE HYDROCHLORIDE 10 MILLIGRAM(S): 5 TABLET ORAL at 14:40

## 2021-06-15 RX ADMIN — SODIUM CHLORIDE 75 MILLILITER(S): 9 INJECTION, SOLUTION INTRAVENOUS at 11:17

## 2021-06-15 RX ADMIN — Medication 975 MILLIGRAM(S): at 15:40

## 2021-06-15 RX ADMIN — CYCLOBENZAPRINE HYDROCHLORIDE 10 MILLIGRAM(S): 10 TABLET, FILM COATED ORAL at 15:40

## 2021-06-15 RX ADMIN — Medication 1 TABLET(S): at 13:06

## 2021-06-15 RX ADMIN — OXYCODONE HYDROCHLORIDE 5 MILLIGRAM(S): 5 TABLET ORAL at 15:40

## 2021-06-15 RX ADMIN — Medication 0.5 MILLIGRAM(S): at 17:02

## 2021-06-15 NOTE — BRIEF OPERATIVE NOTE - NSICDXBRIEFPROCEDURE_GEN_ALL_CORE_FT
PROCEDURES:  Anterior cervical discectomy and fusion (ACDF) 15-Tyrese-2021 10:50:09  Lily Mccracken

## 2021-06-15 NOTE — CONSULT NOTE ADULT - SUBJECTIVE AND OBJECTIVE BOX
DESIRAE CHAMBERS is a 55y Female s/p ANTERIOR CERVICAL DISCECTOMY FUSION C5-6 WITH IMAGE    ANTERIOR CERVICAL DISCECTOMY FUSION C5-7 WITH IMAGE      w/ h/o No pertinent past medical history    Diabetes    Carcinoma of nasal tip    Rheumatoid arthritis      denies any chest pain shortness of breath palpitation dizziness lightheadedness nausea vomiting fever or chills    S/P cholecystectomy    History of lumbar laminectomy        SH: doesnot smoke or drink at this time    No Known Allergies    acetaminophen   Tablet .. 975 milliGRAM(s) Oral every 8 hours  ALPRAZolam 1 milliGRAM(s) Oral at bedtime  ascorbic acid 500 milliGRAM(s) Oral two times a day  atorvastatin 20 milliGRAM(s) Oral at bedtime  benzocaine 15 mG/menthol 3.6 mG (Sugar-Free) Lozenge 1 Lozenge Oral every 2 hours PRN  ceFAZolin   IVPB 2000 milliGRAM(s) IV Intermittent every 8 hours  cyclobenzaprine 10 milliGRAM(s) Oral every 8 hours  dextrose 40% Gel 15 Gram(s) Oral once  dextrose 5%. 1000 milliLiter(s) IV Continuous <Continuous>  dextrose 5%. 1000 milliLiter(s) IV Continuous <Continuous>  dextrose 50% Injectable 25 Gram(s) IV Push once  dextrose 50% Injectable 12.5 Gram(s) IV Push once  dextrose 50% Injectable 25 Gram(s) IV Push once  diphenhydrAMINE   Injectable 12.5 milliGRAM(s) IV Push every 4 hours PRN  glucagon  Injectable 1 milliGRAM(s) IntraMuscular once  HYDROmorphone  Injectable 0.5 milliGRAM(s) IV Push every 4 hours PRN  insulin lispro (ADMELOG) corrective regimen sliding scale   SubCutaneous three times a day before meals  lactated ringers. 1000 milliLiter(s) IV Continuous <Continuous>  metoclopramide Injectable 10 milliGRAM(s) IV Push every 8 hours PRN  multivitamin 1 Tablet(s) Oral daily  ondansetron Injectable 4 milliGRAM(s) IV Push every 6 hours PRN  oxyCODONE    IR 5 milliGRAM(s) Oral every 4 hours PRN  oxyCODONE    IR 10 milliGRAM(s) Oral every 4 hours PRN  senna 2 Tablet(s) Oral at bedtime    T(C): 37.1 (06-15-21 @ 21:38), Max: 37.1 (06-15-21 @ 21:38)  HR: 76 (06-15-21 @ 21:38) (64 - 79)  BP: 131/78 (06-15-21 @ 21:38) (111/63 - 151/78)  RR: 16 (06-15-21 @ 21:38) (15 - 20)  SpO2: 93% (06-15-21 @ 21:38) (92% - 100%)  HEENT unremarkable  neck no JVD or bruit  heart normal S1 S2 RRR no gallops or rubs  chest clear to auscultation  abd sof nontender non distended +bs  ext no calf tenderness    A/P   DVT PX  pain control  bowel regimen   wound care as per ortho  GI PX  antiemetics prn  incentive spirometer

## 2021-06-15 NOTE — PATIENT PROFILE ADULT - LONGEST PERIOD TOBACCO-FREE
I don't know how problematic his feeding schedule is and I know he is working with GI.  I know Dr. Lilly really wanted him back next week.  I will extend the leave through Tuesday but mother may want to call GI and let them know she is still having trouble because we really need to work on getting him back to school.   9mths

## 2021-06-15 NOTE — DISCHARGE NOTE PROVIDER - NSDCFUADDAPPT_GEN_ALL_CORE_FT
Follow up with your surgeon in two weeks. Call for appointment.  If you need more pain medication, call your surgeon's office. For medication refills or authorizations, please call [phone] xt [phone]  We recommend that you call and schedule a follow up appointment within 2-4 weeks with your primary care physician for repeat blood work (CBC and BMP) for post hospital discharge follow-up care.  Call your surgeon if you have increased redness/pain/drainage or fever. Return to ER for shortness of breath/calf tenderness. Follow up with your surgeon in two weeks. Call for appointment.    If you need more pain medication, call your surgeon's office. For medication refills or authorizations, please call [phone] xt [phone]    We recommend that you call and schedule a follow up appointment within 2-4 weeks with your primary care physician for repeat blood work (CBC and BMP) for post hospital discharge follow-up care.    Call your surgeon if you have increased redness/pain/drainage or fever. Return to ER for shortness of breath/calf tenderness.

## 2021-06-15 NOTE — DISCHARGE NOTE PROVIDER - HOSPITAL COURSE
55yFemale with history of Cervical radiculopathy presenting for ACDF C5-6 by Dr. Purvis on 6/15/21. Risk and benefits of surgery were explained to the patient. The patient understood and agreed to proceed with surgery. Patient underwent the procedure with no intraoperative complications. Pt was brought in stable condition to the PACU. Once stable in PACU, pt was brought to the floor. During hospital stay pt was followed by Medicine, physical therapy, Home Care during this admission. Pt had an uneventful hospital course. Pt is stable for discharge to home

## 2021-06-15 NOTE — DISCHARGE NOTE PROVIDER - NSDCFUADDINST_GEN_ALL_CORE_FT
No bending/lifting/twisting/pulling/pushing/carrying or driving. No blood thinners (not limited to but including) aspirin, motrin, alleve, naproxen, etc.    Keep Dressing Clean, Dry and Intact. May shower on POD#5 with Dressing on. Dressing may be removed on POD#7. Please do not scrub, soak, peel or pick at the dressing. No creams, lotions, or oils over dressing. May shower on POD#5 and let water run over dressing, no baths. Pat dry once out of shower.     If dressing is saturated from border to border. Remove dressing and cover with clean dry dressing.  No bending/lifting/twisting/pulling/pushing/carrying or driving. No blood thinners (not limited to but including) aspirin, motrin, alleve, naproxen, etc.    Aspen collar at all times, may switch to pink collar for showers    Keep Dressing Clean, Dry and Intact. May shower on POD#5 (6/21/2021) with Dressing on. Dressing may be removed on POD#7 (6/23/2021) Please do not scrub, soak, peel or pick at the dressing. No creams, lotions, or oils over dressing. May shower on POD#5 and let water run over dressing, no baths. Pat dry once out of shower.     If dressing is saturated from border to border. Remove dressing and cover with clean dry dressing.

## 2021-06-15 NOTE — DISCHARGE NOTE PROVIDER - NSDCMRMEDTOKEN_GEN_ALL_CORE_FT
acetaminophen 325 mg oral tablet: 2 tab(s) orally every 6 hours, As needed, Mild Pain (1 - 3), Moderate Pain (4 - 6), Severe Pain (7 - 10)  ascorbic acid 500 mg oral tablet: 1 tab(s) orally 2 times a day  atorvastatin 20 mg oral tablet: 1 tab(s) orally once a day  gabapentin: 1 tab(s) orally 2 times a day  Lantus 100 units/mL subcutaneous solution: 25 unit(s) subcutaneous once a day  Multiple Vitamins oral tablet: 1 tab(s) orally once a day  sulfADIAZINE 500 mg oral tablet: 1 tab(s) orally 2 times a day  Trulicity Pen 4.5 mg/0.5 mL subcutaneous solution: 1 dose(s) subcutaneous once a week  Xanax 1 mg oral tablet: 1 tab(s) orally once a day (at bedtime)   acetaminophen 325 mg oral tablet: 3 tab(s) orally every 8 hours, As Needed - for pain/fever  ascorbic acid 500 mg oral tablet: 1 tab(s) orally 2 times a day  atorvastatin 20 mg oral tablet: 1 tab(s) orally once a day  cyclobenzaprine 10 mg oral tablet: 1 tab(s) orally every 8 hours as needed for muscle spasm MDD:3 tablets  gabapentin: 1 tab(s) orally 2 times a day  Lantus 100 units/mL subcutaneous solution: 25 unit(s) subcutaneous once a day  Multiple Vitamins oral tablet: 1 tab(s) orally once a day  senna oral tablet: 2 tab(s) orally once a day (at bedtime)  sulfADIAZINE 500 mg oral tablet: 1 tab(s) orally 2 times a day  Trulicity Pen 4.5 mg/0.5 mL subcutaneous solution: 1 dose(s) subcutaneous once a week  Xanax 1 mg oral tablet: 1 tab(s) orally once a day (at bedtime)

## 2021-06-15 NOTE — PATIENT PROFILE ADULT - DO YOU FEEL THREATENED BY OTHERS?
HISTORY OF PRESENT ILLNESS:  The patient is a 39 year old female coming today for session regarding her acute and chronic medical issues. She does have chronic tension-type headaches. She had been taking tramadol for this but is interested in other medications. She also uses naproxen is requesting a refill today. She has been using amitriptyline for her depression and tolerates it.    Patient also has occasional leg cramps. She will use clonazepam when this occurs. Again she will be seeing her pain specialist and would hope to wean off this medication.    Patient also has asthma and notes that she's been having to use her albuterol once or twice a day recently with weather changes.    Past Medical History:   Diagnosis Date   • Diabetes mellitus    • Essential (primary) hypertension    • Neuromuscular disorder    • RAD (reactive airway disease)        ALLERGIES:  No Known Allergies    Current Outpatient Prescriptions   Medication Sig   • naproxen (NAPROSYN) 500 MG tablet Take 1 tablet by mouth 2 times daily.   • clonazePAM (KLONOPIN) 0.5 MG tablet TAKE ONE TABLET BY MOUTH TWICE DAILY AS NEEDED FOR ANXIETY   • amitriptyline (ELAVIL) 50 MG tablet Take 0.5 tablets by mouth nightly.   • naproxen (NAPROSYN) 500 MG tablet Take 1 tablet by mouth 2 times daily.   • lisinopril (ZESTRIL) 20 MG tablet Take 1 tablet by mouth daily.   • metFORMIN (GLUCOPHAGE) 850 MG tablet Take 1 tablet by mouth 2 times daily (with meals).   • hydrochlorothiazide (HYDRODIURIL) 25 MG tablet Take 1 tablet by mouth daily.   • albuterol 108 (90 BASE) MCG/ACT inhaler Inhale 2 puffs into the lungs every 4 hours as needed for Shortness of Breath or Wheezing.   • atorvastatin (LIPITOR) 20 MG tablet Take 1 tablet by mouth daily.   • meclizine (ANTIVERT) 12.5 MG tablet TAKE ONE TABLET BY MOUTH THREE TIMES DAILY AS NEEDED FOR DIZZINESS   • mometasone (ASMANEX 60 METERED DOSES) 220 MCG/INH inhaler Inhale 1 puff into the lungs 2 times daily.     No current  facility-administered medications for this visit.        Social History   Substance Use Topics   • Smoking status: Current Every Day Smoker     Types: Cigarettes   • Smokeless tobacco: Never Used   • Alcohol use No       Family History   Problem Relation Age of Onset   • High blood pressure Father    • Diabetes Maternal Grandmother        REVIEW OF SYSTEMS:  The rest of the cardiovascular, respiratory, gastrointestinal, neurologic, urinary and musculoskeletal and all other systems are reviewed and are negative.    PHYSICAL EXAM:  VITAL SIGNS:   Visit Vitals   • /82   • Pulse 70   • Wt 85.7 kg   • LMP 04/16/2017   • BMI 35.71 kg/m2     GENERAL: Healthy, alert and in no distress. Affect is varied and appropriate.  HEENT: Head normocephalic. No masses, lesions, tenderness or abnormalities. Pupils equal, round, reactive to light. Extraocular movements intact. Sclerae white and non-icteric, no conjunctival erythema, exudate or swelling.  Normal lids.  External ears normal. Canals clear. Tympanic membranes normal.  Nose normal.  Lips, mucosa, and tongue normal. Teeth and gums normal. Oropharynx clear.  NECK: Symmetric, supple, without adenopathy and thyroid normal size, non-tender, without nodularity.  CHEST: Symmetrical, no chest deformities noted and no chest wall tenderness.  LUNGS: Clear to auscultation and percussion.   CARDIOVASCULAR: Regular rate and rhythm and no murmurs, clicks, or gallops.   ABDOMEN: Soft, non-tender, non-distended.  No masses palpated.  No hepatosplenomegaly.  Normal active bowel sounds.  NEUROLOGIC: Cranial nerves II-XII intact. Sensory and motor grossly intact. Reflexes normal and symmetric.  SKIN: Skin color, texture, turgor are normal. There are no bruises, rashes or lesions.    ASSESSMENT:   1. Chronic tension-type headache, not intractable    2. Depression, unspecified depression type    3. Nocturnal muscle cramps    4. AB (asthmatic bronchitis), mild persistent, uncomplicated         PLAN:  We will refill her medications. She will stop the tramadol and we will increase her amitriptyline. She is also started on Asmanex inhaler.  Orders Placed This Encounter   • naproxen (NAPROSYN) 500 MG tablet   • clonazePAM (KLONOPIN) 0.5 MG tablet   • amitriptyline (ELAVIL) 50 MG tablet   • mometasone (ASMANEX 60 METERED DOSES) 220 MCG/INH inhaler      no

## 2021-06-15 NOTE — PHYSICAL THERAPY INITIAL EVALUATION ADULT - ADDITIONAL COMMENTS
As per patient, she lives in a private house with no steps to enter (if walking around the back) and no steps to negotiate once inside. Patient reports that she owns a rolling walker and straight cane from previous surgery.

## 2021-06-15 NOTE — DISCHARGE NOTE PROVIDER - CARE PROVIDER_API CALL
Ulises Purvis)  Orthopaedic Surgery  [address] [place] [address]  Phone: [phone]  Fax: [phone]  Follow Up Time:

## 2021-06-16 ENCOUNTER — TRANSCRIPTION ENCOUNTER (OUTPATIENT)
Age: 56
End: 2021-06-16

## 2021-06-16 VITALS
DIASTOLIC BLOOD PRESSURE: 77 MMHG | TEMPERATURE: 99 F | SYSTOLIC BLOOD PRESSURE: 130 MMHG | OXYGEN SATURATION: 94 % | HEART RATE: 95 BPM | RESPIRATION RATE: 20 BRPM

## 2021-06-16 LAB
ANION GAP SERPL CALC-SCNC: 4 MMOL/L — LOW (ref 5–17)
BASOPHILS # BLD AUTO: 0.02 K/UL — SIGNIFICANT CHANGE UP (ref 0–0.2)
BASOPHILS NFR BLD AUTO: 0.1 % — SIGNIFICANT CHANGE UP (ref 0–2)
BUN SERPL-MCNC: 12 MG/DL — SIGNIFICANT CHANGE UP (ref 7–23)
CALCIUM SERPL-MCNC: 8.8 MG/DL — SIGNIFICANT CHANGE UP (ref 8.5–10.1)
CHLORIDE SERPL-SCNC: 107 MMOL/L — SIGNIFICANT CHANGE UP (ref 96–108)
CO2 SERPL-SCNC: 27 MMOL/L — SIGNIFICANT CHANGE UP (ref 22–31)
COVID-19 SPIKE DOMAIN AB INTERP: POSITIVE
COVID-19 SPIKE DOMAIN ANTIBODY RESULT: >250 U/ML — HIGH
CREAT SERPL-MCNC: 0.52 MG/DL — SIGNIFICANT CHANGE UP (ref 0.5–1.3)
EOSINOPHIL # BLD AUTO: 0.07 K/UL — SIGNIFICANT CHANGE UP (ref 0–0.5)
EOSINOPHIL NFR BLD AUTO: 0.4 % — SIGNIFICANT CHANGE UP (ref 0–6)
GLUCOSE BLDC GLUCOMTR-MCNC: 132 MG/DL — HIGH (ref 70–99)
GLUCOSE BLDC GLUCOMTR-MCNC: 283 MG/DL — HIGH (ref 70–99)
GLUCOSE SERPL-MCNC: 133 MG/DL — HIGH (ref 70–99)
HCT VFR BLD CALC: 37.7 % — SIGNIFICANT CHANGE UP (ref 34.5–45)
HGB BLD-MCNC: 12.6 G/DL — SIGNIFICANT CHANGE UP (ref 11.5–15.5)
IMM GRANULOCYTES NFR BLD AUTO: 0.6 % — SIGNIFICANT CHANGE UP (ref 0–1.5)
LYMPHOCYTES # BLD AUTO: 17.3 % — SIGNIFICANT CHANGE UP (ref 13–44)
LYMPHOCYTES # BLD AUTO: 2.82 K/UL — SIGNIFICANT CHANGE UP (ref 1–3.3)
MCHC RBC-ENTMCNC: 30.1 PG — SIGNIFICANT CHANGE UP (ref 27–34)
MCHC RBC-ENTMCNC: 33.4 GM/DL — SIGNIFICANT CHANGE UP (ref 32–36)
MCV RBC AUTO: 90.2 FL — SIGNIFICANT CHANGE UP (ref 80–100)
MONOCYTES # BLD AUTO: 1.39 K/UL — HIGH (ref 0–0.9)
MONOCYTES NFR BLD AUTO: 8.5 % — SIGNIFICANT CHANGE UP (ref 2–14)
NEUTROPHILS # BLD AUTO: 11.91 K/UL — HIGH (ref 1.8–7.4)
NEUTROPHILS NFR BLD AUTO: 73.1 % — SIGNIFICANT CHANGE UP (ref 43–77)
NRBC # BLD: 0 /100 WBCS — SIGNIFICANT CHANGE UP (ref 0–0)
PLATELET # BLD AUTO: 218 K/UL — SIGNIFICANT CHANGE UP (ref 150–400)
POTASSIUM SERPL-MCNC: 3.8 MMOL/L — SIGNIFICANT CHANGE UP (ref 3.5–5.3)
POTASSIUM SERPL-SCNC: 3.8 MMOL/L — SIGNIFICANT CHANGE UP (ref 3.5–5.3)
RBC # BLD: 4.18 M/UL — SIGNIFICANT CHANGE UP (ref 3.8–5.2)
RBC # FLD: 13.8 % — SIGNIFICANT CHANGE UP (ref 10.3–14.5)
SARS-COV-2 IGG+IGM SERPL QL IA: >250 U/ML — HIGH
SARS-COV-2 IGG+IGM SERPL QL IA: POSITIVE
SODIUM SERPL-SCNC: 138 MMOL/L — SIGNIFICANT CHANGE UP (ref 135–145)
WBC # BLD: 16.31 K/UL — HIGH (ref 3.8–10.5)
WBC # FLD AUTO: 16.31 K/UL — HIGH (ref 3.8–10.5)

## 2021-06-16 RX ORDER — SENNA PLUS 8.6 MG/1
2 TABLET ORAL
Qty: 0 | Refills: 0 | DISCHARGE
Start: 2021-06-16

## 2021-06-16 RX ORDER — CYCLOBENZAPRINE HYDROCHLORIDE 10 MG/1
1 TABLET, FILM COATED ORAL
Qty: 9 | Refills: 0
Start: 2021-06-16 | End: 2021-06-18

## 2021-06-16 RX ADMIN — Medication 975 MILLIGRAM(S): at 14:00

## 2021-06-16 RX ADMIN — Medication 500 MILLIGRAM(S): at 05:11

## 2021-06-16 RX ADMIN — Medication 3: at 11:07

## 2021-06-16 RX ADMIN — INSULIN GLARGINE 15 UNIT(S): 100 INJECTION, SOLUTION SUBCUTANEOUS at 08:02

## 2021-06-16 RX ADMIN — CYCLOBENZAPRINE HYDROCHLORIDE 10 MILLIGRAM(S): 10 TABLET, FILM COATED ORAL at 05:11

## 2021-06-16 RX ADMIN — CYCLOBENZAPRINE HYDROCHLORIDE 10 MILLIGRAM(S): 10 TABLET, FILM COATED ORAL at 13:00

## 2021-06-16 RX ADMIN — Medication 975 MILLIGRAM(S): at 13:00

## 2021-06-16 RX ADMIN — Medication 100 MILLIGRAM(S): at 07:37

## 2021-06-16 RX ADMIN — Medication 1 TABLET(S): at 11:07

## 2021-06-16 RX ADMIN — Medication 975 MILLIGRAM(S): at 06:11

## 2021-06-16 RX ADMIN — Medication 100 MILLIGRAM(S): at 00:09

## 2021-06-16 RX ADMIN — Medication 975 MILLIGRAM(S): at 05:11

## 2021-06-16 NOTE — DISCHARGE NOTE NURSING/CASE MANAGEMENT/SOCIAL WORK - PATIENT PORTAL LINK FT
"Abril Cast RN Utilization Review [phone]  Fax # [phone]      Notification of admission faxed in on 11/27/18 and additional clinicals faxed on 11/30.  Authorization is still pending.  Please call or fax with inpatient authorization.       Tashia Leon (61 y.o. Female)     Date of Birth Social Security Number Address Home Phone MRN    1957  [id number]  [facility] [address] [phone] [id number]    Hindu Marital Status          None        Admission Date Admission Type Admitting Provider Attending Provider Department, Room/Bed    11/26/18 Emergency Halina Walker DO Roesch, Lyndsey, DO [address], S[address]/1    Discharge Date Discharge Disposition Discharge Destination                       Attending Provider:  Halina Walker DO    Allergies:  Statins, Ancef [Cefazolin], Bactrim [Sulfamethoxazole-trimethoprim], Cephalosporins, Cleocin [Clindamycin Hcl], Clindamycin/lincomycin, Keflex [Cephalexin], Levaquin [Levofloxacin], Lipitor [Atorvastatin], Lisinopril, Losartan Potassium, Oxycodone, Quinolones    Isolation:  None   Infection:  None   Code Status:  CPR    Ht:  167.6 cm (65.98\")   Wt:  109 kg (240 lb 4.8 oz)    Admission Cmt:  None   Principal Problem:  Diabetic ulcer of right foot associated with type 2 diabetes mellitus (CMS/[facility]) [E11.621,L97.519]                 Active Insurance as of 11/26/2018     Primary Coverage     Payor Plan Insurance Group Employer/Plan Group    [id number]     Payor Plan Address Payor Plan Phone Number Payor Plan Fax Number Effective Dates    PO    1/16/2018 - None Entered    [place] [employer] [id number]       Subscriber Name Subscriber Birth Date Member ID       TASHIA LEON 1957 [id number]                 Emergency Contacts      (Rel.) Home Phone Work Phone Mobile Phone    Bisi Lucia (Mother) -- -- [phone]            ICU Vital Signs     Row Name 12/03/18 1126 " 12/03/18 0745 12/03/18 0635 12/03/18 0422 12/03/18 0353       Vitals    Temp  99.1 °F (37.3 °C)  98.8 °F (37.1 °C)  --  --  98.4 °F (36.9 °C)    Temp src  Oral  Oral  --  --  Oral    Pulse  76  81  --  --  78    Heart Rate Source  Monitor  Monitor  --  --  Monitor    Resp  17  17  --  --  18    Resp Rate Source  Visual  Visual  --  --  Visual    BP  116/50  133/55  --  --  123/55    BP Location  Right arm  Right arm  --  --  Right arm    BP Method  Automatic  Automatic  --  --  Automatic    Patient Position  Lying  Lying  --  --  Lying       Oxygen Therapy    SpO2  94 %  94 %  --  --  96 %    Device (Oxygen Therapy)  --  --  room air  room air  --    Row Name 12/03/18 0228 12/03/18 0217 12/03/18 0031 12/03/18 0006 12/02/18 2236       Vitals    Temp  --  --  --  98.4 °F (36.9 °C)  --    Temp src  --  --  --  Oral  --    Pulse  80  --  --  76  --    Heart Rate Source  Monitor  --  --  Monitor  --    Resp  16  --  --  16  --    Resp Rate Source  Visual  --  --  Visual  --    BP  123/59  --  --  95/46  --    Noninvasive MAP (mmHg)  85  --  --  --  --    BP Location  Right arm  --  --  Left arm  --    BP Method  Automatic  --  --  Automatic  --    Patient Position  Lying  --  --  Lying  --       Oxygen Therapy    SpO2  --  --  --  97 %  --    Device (Oxygen Therapy)  room air  room air  room air  --  room air    Row Name 12/02/18 2030 12/02/18 1904 12/02/18 1600             Vitals    Temp  --  --  98.3 °F (36.8 °C)      Temp src  --  Oral  Oral      Pulse  --  73  73      Heart Rate Source  --  Monitor  Monitor      Resp  --  18  --      BP  --  107/53  101/52      BP Location  --  Left arm  Left arm      BP Method  --  Automatic  Automatic      Patient Position  --  Lying  Lying         Oxygen Therapy    SpO2  --  98 %  93 %      Pulse Oximetry Type  --  --  Intermittent      Device (Oxygen Therapy)  room air  --  --          Hospital Medications (active)       Dose Frequency Start End    aspirin EC tablet 325 mg 325 mg  You can access the FollowMyHealth Patient Portal offered by [facility] by registering at the following website: [email or web address]. By joining [employer]’s FollowMyHealth portal, you will also be able to view your health information using other applications (apps) compatible with our system. Daily 11/27/2018     Sig - Route: Take 1 tablet by mouth Daily. - Oral    bisacodyl (DULCOLAX) EC tablet 10 mg 10 mg Daily PRN 12/1/2018     Sig - Route: Take 2 tablets by mouth Daily As Needed for Constipation. - Oral    castor oil-balsam peru (VENELEX) ointment  Every 12 Hours Scheduled 12/3/2018     Sig - Route: Apply  topically to the appropriate area as directed Every 12 (Twelve) Hours. - Topical    Cosign for Ordering: Accepted by Halina Walker DO on 12/3/2018 11:38 AM    clonazePAM (KlonoPIN) tablet 0.5 mg 0.5 mg 2 Times Daily PRN 12/1/2018 12/11/2018    Sig - Route: Take 1 tablet by mouth 2 (Two) Times a Day As Needed for Anxiety. - Oral    DAPTOmycin (CUBICIN) 500 mg in sodium chloride 0.9 % 50 mL IVPB 6 mg/kg × 79.2 kg (Adjusted) Every 48 Hours 11/28/2018 12/2/2018    Sig - Route: Infuse 500 mg into a venous catheter Every Other Day. - Intravenous    DAPTOmycin (CUBICIN) 500 mg in sodium chloride 0.9 % 50 mL IVPB 6 mg/kg × 79.2 kg (Adjusted) Every 48 Hours 12/4/2018 12/12/2018    Sig - Route: Infuse 500 mg into a venous catheter Every Other Day. - Intravenous    dextrose (D50W) 25 g/ 50mL Intravenous Solution 25 g 25 g Every 15 Minutes PRN 11/26/2018     Sig - Route: Infuse 50 mL into a venous catheter Every 15 (Fifteen) Minutes As Needed for Low Blood Sugar (Blood Sugar Less Than 70). - Intravenous    dextrose (GLUTOSE) oral gel 15 g 15 g Every 15 Minutes PRN 11/26/2018     Sig - Route: Take 15 g by mouth Every 15 (Fifteen) Minutes As Needed for Low Blood Sugar (Blood sugar less than 70). - Oral    diltiaZEM CD (CARDIZEM CD) 24 hr capsule 120 mg 120 mg Nightly 11/27/2018     Sig - Route: Take 1 capsule by mouth Every Night. - Oral    docusate sodium (COLACE) capsule 100 mg 100 mg Daily 12/1/2018     Sig - Route: Take 1 capsule by mouth Daily. - Oral    ertapenem (INVanz) 500 mg in sodium chloride 0.9 % 50 mL IVPB 500 mg Every 24 Hours 11/27/2018 12/10/2018    Sig - Route: Infuse 500 mg into a venous  "catheter Daily. - Intravenous    ferric gluconate (FERRLECIT) 125 mg in sodium chloride 0.9 % 110 mL IVPB 125 mg Daily 12/3/2018 12/6/2018    Sig - Route: Infuse 125 mg into a venous catheter Daily. - Intravenous    glucagon (human recombinant) (GLUCAGEN DIAGNOSTIC) injection 1 mg 1 mg As Needed 11/26/2018     Sig - Route: Inject 1 mg under the skin into the appropriate area as directed As Needed (Blood Glucose Less Than 70). - Subcutaneous    heparin (porcine) 5000 UNIT/ML injection 5,000 Units 5,000 Units Every 12 Hours Scheduled 11/26/2018     Sig - Route: Inject 1 mL under the skin into the appropriate area as directed Every 12 (Twelve) Hours. - Subcutaneous    HYDROcodone-acetaminophen (NORCO) 7.5-325 MG per tablet 1 tablet * Brand Name Norco--Patient Supplied Medication * 1 tablet 3 Times Daily 11/30/2018 12/10/2018    Sig - Route: Take 1 tablet by mouth 3 (Three) Times a Day. - Oral    Non-formulary Exception Code: Patient supplied medication    HYDROmorphone (DILAUDID) injection 0.5 mg 0.5 mg Every 3 Hours PRN 11/26/2018 12/6/2018    Sig - Route: Infuse 0.5 mL into a venous catheter Every 3 (Three) Hours As Needed for Severe Pain . - Intravenous    Linked Group 1:  \"And\" Linked Group Details        HYDROmorphone (DILAUDID) injection 2 mg 2 mg Every 2 Hours PRN 11/30/2018 12/9/2018    Sig - Route: Infuse 1 mL into a venous catheter Every 2 (Two) Hours As Needed for Severe Pain  (does prior to vascular study 11/30/18). - Intravenous    insulin detemir (LEVEMIR) injection 40 Units 40 Units Every 12 Hours Scheduled 11/27/2018     Sig - Route: Inject 40 Units under the skin into the appropriate area as directed Every 12 (Twelve) Hours. - Subcutaneous    insulin lispro (humaLOG) injection 0-14 Units 0-14 Units 4 Times Daily With Meals & Nightly 11/26/2018     Sig - Route: Inject 0-14 Units under the skin into the appropriate area as directed 4 (Four) Times a Day With Meals & at Bedtime. - Subcutaneous    mupirocin " "(BACTROBAN) 2 % cream  Every 24 Hours Scheduled 11/29/2018     Sig - Route: Apply  topically to the appropriate area as directed Daily. - Topical    Naloxegol Oxalate (MOVANTIK) tablet 12.5 mg 12.5 mg 2 Times Weekly 11/28/2018     Sig - Route: Take 1 tablet by mouth Once per day on Sun Wed. - Oral    Notes to Pharmacy: Wednesday and Sunday    naloxone (NARCAN) injection 0.4 mg 0.4 mg Every 5 Minutes PRN 11/26/2018     Sig - Route: Infuse 1 mL into a venous catheter Every 5 (Five) Minutes As Needed for Respiratory Depression. - Intravenous    Linked Group 1:  \"And\" Linked Group Details        ondansetron (ZOFRAN) injection 4 mg 4 mg Every 6 Hours PRN 11/26/2018     Sig - Route: Infuse 2 mL into a venous catheter Every 6 (Six) Hours As Needed for Nausea or Vomiting. - Intravenous    Linked Group 2:  \"Or\" Linked Group Details        ondansetron (ZOFRAN) tablet 4 mg 4 mg Every 6 Hours PRN 11/26/2018     Sig - Route: Take 1 tablet by mouth Every 6 (Six) Hours As Needed for Nausea or Vomiting. - Oral    Linked Group 2:  \"Or\" Linked Group Details        pantoprazole (PROTONIX) EC tablet 40 mg 40 mg Every Morning 11/27/2018     Sig - Route: Take 1 tablet by mouth Every Morning. - Oral    sodium chloride 0.9 % flush 10 mL 10 mL As Needed 11/26/2018     Sig - Route: Infuse 10 mL into a venous catheter As Needed for Line Care. - Intravenous    Linked Group 3:  \"And\" Linked Group Details        sodium chloride 0.9 % flush 3 mL 3 mL Every 12 Hours Scheduled 11/26/2018     Sig - Route: Infuse 3 mL into a venous catheter Every 12 (Twelve) Hours. - Intravenous    sodium chloride 0.9 % flush 3-10 mL 3-10 mL As Needed 11/26/2018     Sig - Route: Infuse 3-10 mL into a venous catheter As Needed for Line Care. - Intravenous    ferrous sulfate tablet 325 mg (Discontinued) 325 mg 2 Times Daily With Meals 11/28/2018 12/3/2018    Sig - Route: Take 1 tablet by mouth 2 (Two) Times a Day With Meals. - Oral          Operative/Procedure Notes " (last 24 hours) (Notes from 12/2/2018  1:56 PM through 12/3/2018  1:56 PM)     No notes of this type exist for this encounter.           Physician Progress Notes (last 24 hours) (Notes from 12/2/2018  1:56 PM through 12/3/2018  1:56 PM)      Niko Deleon PA at 12/3/2018  8:37 AM          INFECTIOUS DISEASE PROGRESS NOTE    Tashia Leon  1957  [id number]    Date: 12/3/2018    Admission Date: 11/26/2018      CC: diabetic foot ulcer    History of present illness:    Patient is a 61 y.o. female with chronic kidney disease stage IV, diabetes type 2 since 1990s has had history of left foot MRSA osteomyelitis treated at  several years ago.  Patient has developed calluses on her right foot and saw [place] podiatry who did some bedside debridement in the clinic.  Patient said increasing pain and some weeping from the wounds because of low-grade temperatures patient's returns to see her podiatrist recommended admission to hospital.  Patient was seen in the emergency room started on IV antibiotics including daptomycin and ertapenem.  She had MRI of the foot which was stopped prematurely by the patient's and was not a complete study.    Asked to address long-term treatment and duration of antibiotics.    Patient lives in [place].    Patient denies C. difficile colitis history of nausea vomiting or diarrhea difficulty breathing patient denies rash.    Patient says she is a PICC line before    11/28/18; no events overnight; no fever, rash, sore throat    11/29/18; no events overnight feels well; no complaints; no diarrhea, rash, sore throat    11/30/18: No new events. Denies f/c, sob, n/v/d, rashes or sore throat.     12/1/18; no events overnight; no fever, rash, sore throat    12/2/18: no new events or complaints.  Denies f/c, sob, n/v/d, rashes.   12/3/18: No new events or complaints.  Foot without pain.  No schedule for dressing changes.  Denies f/c, sob, n/v/d, rashes.     Past Medical History:   Diagnosis  Date   • Acute bronchitis    • Acute pharyngitis    • Acute sinusitis    • Benign colonic polyp    • Edema    • GERD (gastroesophageal reflux disease)    • Hiatal hernia    • Hyperkalemia    • Hyperlipidemia    • Hypertension    • Low back pain    • Lumbar disc disease    • MRSA (methicillin resistant Staphylococcus aureus)     Osteomyelitis/methicillin-resistant Staphylococcus aureus of the left foot, 2013.   • Obesity    • Osteomyelitis (CMS/HCC)     Osteomyelitis/methicillin-resistant Staphylococcus aureus of the left foot, 2013.   • Peptic ulceration 2013    History of peptic ulcer disease, diagnosed 2013 by EGD.  Repeat EGD in 2013 was negative.   • Sepsis (CMS/HCC)     Sepsis, 2013, hospitalized at [facility].   • Tobacco use     Previous tobacco use with cessation in .   • Type 2 diabetes mellitus (CMS/HCC)     Proteinuria noted, 2014.     • Vitamin D deficiency        Past Surgical History:   Procedure Laterality Date   •  SECTION      x2   • FOOT SURGERY Left     Incision and debridement of the left foot x2.   • LASIK     • TONSILLECTOMY         Family History   Problem Relation Age of Onset   • No Known Problems Mother    • No Known Problems Father    • Breast cancer Neg Hx    • Ovarian cancer Neg Hx        Social History     Socioeconomic History   • Marital status:      Spouse name: Not on file   • Number of children: Not on file   • Years of education: Not on file   • Highest education level: Not on file   Social Needs   • Financial resource strain: Not on file   • Food insecurity - worry: Not on file   • Food insecurity - inability: Not on file   • Transportation needs - medical: Not on file   • Transportation needs - non-medical: Not on file   Occupational History   • Not on file   Tobacco Use   • Smoking status: Former Smoker     Packs/day: 1.00     Years: 30.00     Pack years: 30.00     Types: Cigarettes   •  Smokeless tobacco: Never Used   • Tobacco comment: quit 7 years ago   Substance and Sexual Activity   • Alcohol use: Yes     Alcohol/week: 0.6 oz     Types: 1 Glasses of wine per week     Comment: holidays   • Drug use: No   • Sexual activity: Defer   Other Topics Concern   • Not on file   Social History Narrative   • Not on file       Allergies   Allergen Reactions   • Statins Myalgia   • Ancef [Cefazolin]    • Bactrim [Sulfamethoxazole-Trimethoprim] Nausea And Vomiting   • Cephalosporins    • Cleocin [Clindamycin Hcl]    • Clindamycin/Lincomycin    • Keflex [Cephalexin]    • Levaquin [Levofloxacin]    • Lipitor [Atorvastatin]    • Lisinopril    • Losartan Potassium Other (See Comments)     Unknown reaction   • Oxycodone    • Quinolones          Medication:    Current Facility-Administered Medications:   •  aspirin EC tablet 325 mg, 325 mg, Oral, Daily, Rosario Walter APRN, 325 mg at 12/02/18 0914  •  bisacodyl (DULCOLAX) EC tablet 10 mg, 10 mg, Oral, Daily PRN, Heavenly Medina APRN, 10 mg at 12/01/18 0949  •  clonazePAM (KlonoPIN) tablet 0.5 mg, 0.5 mg, Oral, BID PRN, Gonzalo Schmidt MD, 0.5 mg at 12/02/18 2306  •  dextrose (D50W) 25 g/ 50mL Intravenous Solution 25 g, 25 g, Intravenous, Q15 Min PRN, Rosario Walter, APRN  •  dextrose (GLUTOSE) oral gel 15 g, 15 g, Oral, Q15 Min PRN, Rosario Walter APRN  •  diltiaZEM CD (CARDIZEM CD) 24 hr capsule 120 mg, 120 mg, Oral, Nightly, Rosario Walter APRN, 120 mg at 12/02/18 1930  •  docusate sodium (COLACE) capsule 100 mg, 100 mg, Oral, Daily, Heavenly Medina APRN, 100 mg at 12/02/18 0914  •  ertapenem (INVanz) 500 mg in sodium chloride 0.9 % 50 mL IVPB, 500 mg, Intravenous, Q24H, Rosario Walter APRN, Last Rate: 100 mL/hr at 12/02/18 1929, 500 mg at 12/02/18 1929  •  ferrous sulfate tablet 325 mg, 325 mg, Oral, BID With Meals, Dannie Luna MD, 325 mg at 11/1957  •  glucagon (human recombinant) (GLUCAGEN  DIAGNOSTIC) injection 1 mg, 1 mg, Subcutaneous, PRN, Rosario Walter APRN  •  heparin (porcine) 5000 UNIT/ML injection 5,000 Units, 5,000 Units, Subcutaneous, Q12H, Rosario Walter APRN, 5,000 Units at 12/02/18 1929  •  HYDROcodone-acetaminophen (NORCO) 7.5-325 MG per tablet 1 tablet * Brand Name Norco--Patient Supplied Medication *, 1 tablet, Oral, TID, Gonzalo Schmidt MD, 1 tablet at 12/02/18 1600  •  HYDROmorphone (DILAUDID) injection 0.5 mg, 0.5 mg, Intravenous, Q3H PRN, 0.5 mg at 12/03/18 0656 **AND** naloxone (NARCAN) injection 0.4 mg, 0.4 mg, Intravenous, Q5 Min PRN, Rosario Walter APRN  •  HYDROmorphone (DILAUDID) injection 2 mg, 2 mg, Intravenous, Q2H PRN, Chio Sterling MD, 2 mg at 12/02/18 1059  •  insulin detemir (LEVEMIR) injection 40 Units, 40 Units, Subcutaneous, Q12H, Gonzalo Schmidt MD, 40 Units at 12/02/18 2051  •  insulin lispro (humaLOG) injection 0-14 Units, 0-14 Units, Subcutaneous, 4x Daily With Meals & Nightly, Rosario Walter APRN, 3 Units at 12/02/18 2052  •  mupirocin (BACTROBAN) 2 % cream, , Topical, Q24H, Chio Sterling MD, 1 application at 12/02/18 1109  •  Naloxegol Oxalate (MOVANTIK) tablet 12.5 mg, 12.5 mg, Oral, Once per day on Sun Wed, Rosario Walter APRN  •  ondansetron (ZOFRAN) tablet 4 mg, 4 mg, Oral, Q6H PRN, 4 mg at 11/30/18 2046 **OR** ondansetron (ZOFRAN) injection 4 mg, 4 mg, Intravenous, Q6H PRN, Rosario Walter APRN, 4 mg at 12/02/18 1932  •  pantoprazole (PROTONIX) EC tablet 40 mg, 40 mg, Oral, QAM, Rosario Walter APRN, 40 mg at 12/02/18 0914  •  [COMPLETED] Insert peripheral IV, , , Once **AND** sodium chloride 0.9 % flush 10 mL, 10 mL, Intravenous, PRN, Hiram Osborn MD  •  sodium chloride 0.9 % flush 3 mL, 3 mL, Intravenous, Q12H, Rosario Walter APRN, 3 mL at 12/02/18 1928  •  sodium chloride 0.9 % flush 3-10 mL, 3-10 mL, Intravenous, PRN, Rosario Walter,  APRN    Antibiotics:  Anti-Infectives (From admission, onward)    Ordered     Dose/Rate Route Frequency Start Stop    18 2136  DAPTOmycin (CUBICIN) 500 mg in sodium chloride 0.9 % 50 mL IVPB     Ordering Provider:  Breana Chance MD    6 mg/kg × 79.2 kg (Adjusted)  100 mL/hr over 30 Minutes Intravenous Every 48 Hours 18 1800 18 19518 213  ertapenem (INVanz) 500 mg in sodium chloride 0.9 % 50 mL IVPB     Ordering Provider:  Rosario Walter APRN    500 mg  100 mL/hr over 30 Minutes Intravenous Every 24 Hours 18 1800 18 1759    18 1616  ertapenem (INVanz) 500 mg in sodium chloride 0.9 % 50 mL IVPB     Ordering Provider:  Hiram Osborn MD    500 mg  100 mL/hr over 30 Minutes Intravenous Every 24 Hours 18 1618 18 0015    18 1616  DAPTOmycin (CUBICIN) 500 mg in sodium chloride 0.9 % 50 mL IVPB     Ordering Provider:  Hiram Osborn MD    500 mg  100 mL/hr over 30 Minutes Intravenous Every 24 Hours 18 1617 18 1935            Review of Systems:       Review of systems were reviewed and were unremarkable.  See hpi      Physical Exam:   Vital Signs  Temp (24hrs), Av.5 °F (36.9 °C), Min:98.3 °F (36.8 °C), Max:98.8 °F (37.1 °C)    Temp  Min: 98.3 °F (36.8 °C)  Max: 98.8 °F (37.1 °C)  BP  Min: 95/46  Max: 133/55  Pulse  Min: 73  Max: 81  Resp  Min: 16  Max: 18  SpO2  Min: 93 %  Max: 98 %    GENERAL: Awake and alert, in no acute distress.   HEENT: Normocephalic, atraumatic.  PERRL. EOMI. No conjunctival injection. No icterus. Oropharynx clear without evidence of thrush or exudate.   HEART: RRR; No murmur, rubs, gallops.   LUNGS: Clear to auscultation bilaterally without wheezing, rales, rhonchi.   ABDOMEN: Soft, nontender, nondistended. Positive bowel sounds.    EXT:  No cyanosis, clubbing or edema. No cord.  MSK: FROM without joint effusions noted arms/legs.  Right foot wrapped  SKIN: Warm and dry without cutaneous eruptions on  Inspection/palpation.    NEURO: Oriented to PPT. No focal deficits on motor/sensory exam at arms/legs.  PSYCHIATRIC: Normal insight and judgement. Cooperative with PE    Laboratory Data    Results from last 7 days   Lab Units  12/03/18   0557  12/02/18   0802  11/30/18   0527   WBC 10*3/mm3  12.24*  13.86*  15.18*   HEMOGLOBIN g/dL  8.3*  8.7*  9.3*   HEMATOCRIT %  26.9*  27.9*  29.4*   PLATELETS 10*3/mm3  435  438  406     Results from last 7 days   Lab Units  12/03/18   0557   SODIUM mmol/L  138   POTASSIUM mmol/L  3.6   CHLORIDE mmol/L  102   CO2 mmol/L  24.0   BUN mg/dL  66*   CREATININE mg/dL  3.91*   GLUCOSE mg/dL  173*   CALCIUM mg/dL  8.0*     Results from last 7 days   Lab Units  11/26/18   1759   ALK PHOS U/L  92   BILIRUBIN mg/dL  0.2*   ALT (SGPT) U/L  19   AST (SGOT) U/L  24     Results from last 7 days   Lab Units  11/26/18   1628   SED RATE mm/hr  103*     Results from last 7 days   Lab Units  11/26/18   1759   CRP mg/dL  17.25*     Results from last 7 days   Lab Units  11/26/18   1628   LACTATE mmol/L  2.0     Results from last 7 days   Lab Units  12/01/18   0949  11/27/18   1515   CK TOTAL U/L  121  146         Estimated Creatinine Clearance: 18.9 mL/min (A) (by C-G formula based on SCr of 3.91 mg/dL (H)).      Microbiology:  Microbiology Results Abnormal     Procedure Component Value - Date/Time    Eosinophil Smear - Urine, Urine, Clean Catch [[id number]]  (Normal) Collected:  12/01/18 1629    Lab Status:  Final result Specimen:  Urine, Clean Catch Updated:  12/01/18 1710     Eosinophil Smear 0 % EOS/100 Cells     Narrative:       No eosinophil seen    Blood Culture - Blood, Arm, Left [[id number]] Collected:  11/26/18 1625    Lab Status:  Final result Specimen:  Blood from Arm, Left Updated:  12/01/18 1645     Blood Culture No growth at 5 days    Blood Culture - Blood, Arm, Right [[id number]] Collected:  11/26/18 1620    Lab Status:  Final result Specimen:  Blood from Arm, Right Updated:  12/01/18  1645     Blood Culture No growth at 5 days                       Radiology:  Xr Foot 3+ View Right    Result Date: 11/28/2018  Possible plantar ulceration of the forefoot. No acute osseous abnormality is identified.  D:  11/28/2018 E:  11/28/2018  This report was finalized on 11/28/2018 12:28 PM by Aniceto Deleon.      Mri Foot Right Without Contrast    Result Date: 11/29/2018  Exam terminated early by the patient. Limited scan. No evidence of osteomyelitis, or discrete abscess.  D:  11/26/2018 E:  11/27/2018    This report was finalized on 11/29/2018 12:04 PM by DR. Schuyler Fernando MD.      Doppler arterial:   Addenda:       · Moderate reduction in arterial flow in the right lower extremity. · Monophasic waveforms are present in the right lower extremity with   biphasic waveforms in the left lower extremity · Due to the patients inability to tolerate the study only RLE pressures   were obtained    Signed: 11/29/18 2249 by Gillian Becker MD   Narrative:     · Moderate reduction in arterial flow in the right lower extremity.   Normal-appearing waveforms.     Narrative:     · Left Conclusion: The left NELLI is normal.  · Right Conclusion: The right NELLI is normal.           Impression:   Diabetic foot ulcer on right foot  Leukocytosis with neutrophilia, improving  chronic kidney disease stage IV  Antibiotic intolerances to cefazolin, clindamycin, Levaquin  DM II  Esr > 100    PLAN/RECOMMENDATIONS:     I find it very difficult to interpret an MRI was not completed an MRI that was not given with gadolinium because of her chronic kidney disease.    Physical exam of foot not remarkable for  Osteomyelitis.  I still have some concern with ESr > 100.      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   If patient continues the ulcerations can lead to secondary infection and contiguous spread of infection to the bone;  Unclear is patient already has osteo; bone  biopsy may be gold standard to determine    continue offloading     Offloading and using a knee scooter is probably the most important intervention currently     Cont dapto 6 mg/kg IV Q48H  Cont invanz 500 mg IV Q24H    Patient is at increased risk for limb loss  Nephrology is opposed to picc line    Hung Haynes MD saw and examined patient, verified hx and PE, read all radiographic studies, reviewed labs and micro data, and formulated dx, plan for treatment and all medical decision making.      BEVERLY BahenaC for MD Niko Melendez PA  12/3/2018  8:37 AM                    Electronically signed by Niko Deleon PA at 12/3/2018 11:02 AM     Hung Haynes MD at 12/2/2018  3:31 PM          INFECTIOUS DISEASE PROGRESS NOTE    Tashia CASTILLO Edward  1957  [id number]    Date: 12/2/2018    Admission Date: 11/26/2018      CC: diabetic foot ulcer    History of present illness:    Patient is a 61 y.o. female with chronic kidney disease stage IV, diabetes type 2 since 1990s has had history of left foot MRSA osteomyelitis treated at  several years ago.  Patient has developed calluses on her right foot and saw [facility] who did some bedside debridement in the clinic.  Patient said increasing pain and some weeping from the wounds because of low-grade temperatures patient's returns to see her podiatrist recommended admission to hospital.  Patient was seen in the emergency room started on IV antibiotics including daptomycin and ertapenem.  She had MRI of the foot which was stopped prematurely by the patient's and was not a complete study.    Asked to address long-term treatment and duration of antibiotics.    Patient lives in [place].    Patient denies C. difficile colitis history of nausea vomiting or diarrhea difficulty breathing patient denies rash.    Patient says she is a PICC line before    11/28/18; no events overnight; no fever, rash, sore throat    11/29/18; no events  overnight feels well; no complaints; no diarrhea, rash, sore throat    18: No new events. Denies f/c, sob, n/v/d, rashes or sore throat.     18; no events overnight; no fever, rash, sore throat    18: no new events or complaints.  Denies f/c, sob, n/v/d, rashes.     Past Medical History:   Diagnosis Date   • Acute bronchitis    • Acute pharyngitis    • Acute sinusitis    • Benign colonic polyp    • Edema    • GERD (gastroesophageal reflux disease)    • Hiatal hernia    • Hyperkalemia    • Hyperlipidemia    • Hypertension    • Low back pain    • Lumbar disc disease    • MRSA (methicillin resistant Staphylococcus aureus)     Osteomyelitis/methicillin-resistant Staphylococcus aureus of the left foot, 2013.   • Obesity    • Osteomyelitis (CMS/HCC)     Osteomyelitis/methicillin-resistant Staphylococcus aureus of the left foot, 2013.   • Peptic ulceration 2013    History of peptic ulcer disease, diagnosed 2013 by EGD.  Repeat EGD in 2013 was negative.   • Sepsis (CMS/HCC)     Sepsis, 2013, hospitalized at [facility].   • Tobacco use     Previous tobacco use with cessation in .   • Type 2 diabetes mellitus (CMS/HCC)     Proteinuria noted, 2014.     • Vitamin D deficiency        Past Surgical History:   Procedure Laterality Date   •  SECTION      x2   • FOOT SURGERY Left     Incision and debridement of the left foot x2.   • LASIK     • TONSILLECTOMY         Family History   Problem Relation Age of Onset   • No Known Problems Mother    • No Known Problems Father    • Breast cancer Neg Hx    • Ovarian cancer Neg Hx        Social History     Socioeconomic History   • Marital status:      Spouse name: Not on file   • Number of children: Not on file   • Years of education: Not on file   • Highest education level: Not on file   Social Needs   • Financial resource strain: Not on file   • Food insecurity - worry: Not on file    • Food insecurity - inability: Not on file   • Transportation needs - medical: Not on file   • Transportation needs - non-medical: Not on file   Occupational History   • Not on file   Tobacco Use   • Smoking status: Former Smoker     Packs/day: 1.00     Years: 30.00     Pack years: 30.00     Types: Cigarettes   • Smokeless tobacco: Never Used   • Tobacco comment: quit 7 years ago   Substance and Sexual Activity   • Alcohol use: Yes     Alcohol/week: 0.6 oz     Types: 1 Glasses of wine per week     Comment: holidays   • Drug use: No   • Sexual activity: Defer   Other Topics Concern   • Not on file   Social History Narrative   • Not on file       Allergies   Allergen Reactions   • Statins Myalgia   • Ancef [Cefazolin]    • Bactrim [Sulfamethoxazole-Trimethoprim] Nausea And Vomiting   • Cephalosporins    • Cleocin [Clindamycin Hcl]    • Clindamycin/Lincomycin    • Keflex [Cephalexin]    • Levaquin [Levofloxacin]    • Lipitor [Atorvastatin]    • Lisinopril    • Losartan Potassium Other (See Comments)     Unknown reaction   • Oxycodone    • Quinolones          Medication:    Current Facility-Administered Medications:   •  aspirin EC tablet 325 mg, 325 mg, Oral, Daily, Rosario Walter APRN, 325 mg at 12/02/18 0914  •  bisacodyl (DULCOLAX) EC tablet 10 mg, 10 mg, Oral, Daily PRN, Heavenly Medina APRN, 10 mg at 12/01/18 0949  •  clonazePAM (KlonoPIN) tablet 0.5 mg, 0.5 mg, Oral, BID PRN, Gonzalo Schmidt MD, 0.5 mg at 12/01/18 1619  •  DAPTOmycin (CUBICIN) 500 mg in sodium chloride 0.9 % 50 mL IVPB, 6 mg/kg (Adjusted), Intravenous, Q48H, Breana Chance MD, Last Rate: 100 mL/hr at 11/30/18 1731, 500 mg at 11/30/18 1731  •  dextrose (D50W) 25 g/ 50mL Intravenous Solution 25 g, 25 g, Intravenous, Q15 Min PRN, Rosario Walter, APRN  •  dextrose (GLUTOSE) oral gel 15 g, 15 g, Oral, Q15 Min PRN, Rosario Walter, APRN  •  diltiaZEM CD (CARDIZEM CD) 24 hr capsule 120 mg, 120 mg, Oral, Nightly,  Rosario Walter APRN, 120 mg at 12/01/18 2001  •  docusate sodium (COLACE) capsule 100 mg, 100 mg, Oral, Daily, Heavenly Medina APRN, 100 mg at 12/02/18 0914  •  ertapenem (INVanz) 500 mg in sodium chloride 0.9 % 50 mL IVPB, 500 mg, Intravenous, Q24H, Rosario Walter APRN, Last Rate: 100 mL/hr at 12/01/18 1832, 500 mg at 12/01/18 1832  •  ferrous sulfate tablet 325 mg, 325 mg, Oral, BID With Meals, Jeremy, Dannie Armstrong MD, 325 mg at 11/1957  •  glucagon (human recombinant) (GLUCAGEN DIAGNOSTIC) injection 1 mg, 1 mg, Subcutaneous, PRN, Rosario Walter APRN  •  heparin (porcine) 5000 UNIT/ML injection 5,000 Units, 5,000 Units, Subcutaneous, Q12H, Rosario Walter APRN, 5,000 Units at 12/02/18 0913  •  HYDROcodone-acetaminophen (NORCO) 7.5-325 MG per tablet 1 tablet * Brand Name Norco--Patient Supplied Medication *, 1 tablet, Oral, TID, Gonzalo Schmidt MD, 1 tablet at 12/02/18 0915  •  HYDROmorphone (DILAUDID) injection 0.5 mg, 0.5 mg, Intravenous, Q3H PRN, 0.5 mg at 12/02/18 1447 **AND** naloxone (NARCAN) injection 0.4 mg, 0.4 mg, Intravenous, Q5 Min PRN, Rosario Walter APRN  •  HYDROmorphone (DILAUDID) injection 2 mg, 2 mg, Intravenous, Q2H PRN, Chio Sterling MD, 2 mg at 12/02/18 1059  •  insulin detemir (LEVEMIR) injection 40 Units, 40 Units, Subcutaneous, Q12H, Gonzalo Schmidt MD, 40 Units at 12/02/18 0915  •  insulin lispro (humaLOG) injection 0-14 Units, 0-14 Units, Subcutaneous, 4x Daily With Meals & Nightly, Rosario Walter APRN, 3 Units at 12/01/18 2117  •  mupirocin (BACTROBAN) 2 % cream, , Topical, Q24H, Chio Sterling MD, 1 application at 12/02/18 1109  •  Naloxegol Oxalate (MOVANTIK) tablet 12.5 mg, 12.5 mg, Oral, Once per day on Sun Wed, Rosario Walter APRN  •  ondansetron (ZOFRAN) tablet 4 mg, 4 mg, Oral, Q6H PRN, 4 mg at 11/30/18 2046 **OR** ondansetron (ZOFRAN) injection 4 mg, 4 mg, Intravenous, Q6H PRN, Rosario Walter  KAREEM GABRIEL, 4 mg at 18 0438  •  pantoprazole (PROTONIX) EC tablet 40 mg, 40 mg, Oral, QAM, Rosario Walter APRN, 40 mg at 18 0914  •  [COMPLETED] Insert peripheral IV, , , Once **AND** sodium chloride 0.9 % flush 10 mL, 10 mL, Intravenous, PRN, Hiram Osborn MD  •  sodium chloride 0.9 % flush 3 mL, 3 mL, Intravenous, Q12H, Rosario Walter APRN, 3 mL at 18  •  sodium chloride 0.9 % flush 3-10 mL, 3-10 mL, Intravenous, PRN, Rosario Walter APRN    Antibiotics:  Anti-Infectives (From admission, onward)    Ordered     Dose/Rate Route Frequency Start Stop    18 213  DAPTOmycin (CUBICIN) 500 mg in sodium chloride 0.9 % 50 mL IVPB     Ordering Provider:  Breana Chance MD    6 mg/kg × 79.2 kg (Adjusted)  100 mL/hr over 30 Minutes Intravenous Every 48 Hours 18 1800 18 1759    18 213  ertapenem (INVanz) 500 mg in sodium chloride 0.9 % 50 mL IVPB     Ordering Provider:  Rosario Walter APRN    500 mg  100 mL/hr over 30 Minutes Intravenous Every 24 Hours 18 1800 18 1759    18 1616  ertapenem (INVanz) 500 mg in sodium chloride 0.9 % 50 mL IVPB     Ordering Provider:  Hiram Osborn MD    500 mg  100 mL/hr over 30 Minutes Intravenous Every 24 Hours 18 1618 18 0015    18 1616  DAPTOmycin (CUBICIN) 500 mg in sodium chloride 0.9 % 50 mL IVPB     Ordering Provider:  Hiram Osborn MD    500 mg  100 mL/hr over 30 Minutes Intravenous Every 24 Hours 18 1617 18 1935            Review of Systems:       Review of systems were reviewed and were unremarkable.  See hpi      Physical Exam:   Vital Signs  Temp (24hrs), Av.2 °F (36.8 °C), Min:98 °F (36.7 °C), Max:98.6 °F (37 °C)    Temp  Min: 98 °F (36.7 °C)  Max: 98.6 °F (37 °C)  BP  Min: 112/54  Max: 122/60  Pulse  Min: 74  Max: 78  Resp  Min: 14  Max: 16  SpO2  Min: 92 %  Max: 94 %    GENERAL: Awake and alert, in no acute distress.   HEENT: Normocephalic,  atraumatic.  PERRL. EOMI. No conjunctival injection. No icterus. Oropharynx clear without evidence of thrush or exudate.   HEART: RRR; No murmur, rubs, gallops.   LUNGS: Clear to auscultation bilaterally without wheezing, rales, rhonchi.   ABDOMEN: Soft, nontender, nondistended. Positive bowel sounds.    EXT:  No cyanosis, clubbing or edema. No cord.  MSK: FROM without joint effusions noted arms/legs.  Right foot wrapped  SKIN: Warm and dry without cutaneous eruptions on Inspection/palpation.    NEURO: Oriented to PPT. No focal deficits on motor/sensory exam at arms/legs.  PSYCHIATRIC: Normal insight and judgement. Cooperative with PE    Laboratory Data    Results from last 7 days   Lab Units  12/02/18   0802  11/30/18   0527  11/29/18   1503   WBC 10*3/mm3  13.86*  15.18*  18.34*   HEMOGLOBIN g/dL  8.7*  9.3*  10.3*   HEMATOCRIT %  27.9*  29.4*  31.2*   PLATELETS 10*3/mm3  438  406  415     Results from last 7 days   Lab Units  12/01/18   0949   SODIUM mmol/L  133   POTASSIUM mmol/L  3.8   CHLORIDE mmol/L  95*   CO2 mmol/L  25.0   BUN mg/dL  87*   CREATININE mg/dL  4.38*   GLUCOSE mg/dL  150*   CALCIUM mg/dL  8.2*     Results from last 7 days   Lab Units  11/26/18   1759   ALK PHOS U/L  92   BILIRUBIN mg/dL  0.2*   ALT (SGPT) U/L  19   AST (SGOT) U/L  24     Results from last 7 days   Lab Units  11/26/18   1628   SED RATE mm/hr  103*     Results from last 7 days   Lab Units  11/26/18   1759   CRP mg/dL  17.25*     Results from last 7 days   Lab Units  11/26/18   1628   LACTATE mmol/L  2.0     Results from last 7 days   Lab Units  12/01/18   0949  11/27/18   1515   CK TOTAL U/L  121  146         Estimated Creatinine Clearance: 16.9 mL/min (A) (by C-G formula based on SCr of 4.38 mg/dL (H)).      Microbiology:  Microbiology Results Abnormal     Procedure Component Value - Date/Time    Eosinophil Smear - Urine, Urine, Clean Catch [[id number]]  (Normal) Collected:  12/01/18 7526    Lab Status:  Final result Specimen:   Urine, Clean Catch Updated:  12/01/18 1710     Eosinophil Smear 0 % EOS/100 Cells     Narrative:       No eosinophil seen    Blood Culture - Blood, Arm, Left [[id number]] Collected:  11/26/18 1625    Lab Status:  Final result Specimen:  Blood from Arm, Left Updated:  12/01/18 1645     Blood Culture No growth at 5 days    Blood Culture - Blood, Arm, Right [[id number]] Collected:  11/26/18 1620    Lab Status:  Final result Specimen:  Blood from Arm, Right Updated:  12/01/18 1645     Blood Culture No growth at 5 days                       Radiology:  Xr Foot 3+ View Right    Result Date: 11/28/2018  Possible plantar ulceration of the forefoot. No acute osseous abnormality is identified.  D:  11/28/2018 E:  11/28/2018  This report was finalized on 11/28/2018 12:28 PM by Aniceto Deleon.      Mri Foot Right Without Contrast    Result Date: 11/29/2018  Exam terminated early by the patient. Limited scan. No evidence of osteomyelitis, or discrete abscess.  D:  11/26/2018 E:  11/27/2018    This report was finalized on 11/29/2018 12:04 PM by DR. Schuyler Fernando MD.      Doppler arterial:   Addenda:       · Moderate reduction in arterial flow in the right lower extremity. · Monophasic waveforms are present in the right lower extremity with   biphasic waveforms in the left lower extremity · Due to the patients inability to tolerate the study only RLE pressures   were obtained    Signed: 11/29/18 9765 by Gillian Becker MD   Narrative:     · Moderate reduction in arterial flow in the right lower extremity.   Normal-appearing waveforms.     Narrative:     · Left Conclusion: The left NELLI is normal.  · Right Conclusion: The right NELLI is normal.           Impression:   Diabetic foot ulcer on right foot  Leukocytosis with neutrophilia, improving  chronic kidney disease stage IV  Antibiotic intolerances to cefazolin, clindamycin, Levaquin  DM II  Esr > 100    PLAN/RECOMMENDATIONS:     I find it very difficult to interpret an MRI was not  completed an MRI that was not given with gadolinium because of her chronic kidney disease.    Physical exam of foot not remarkable for  Osteomyelitis.  I still have some concern with ESr > 100.      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   If patient continues the ulcerations can lead to secondary infection and contiguous spread of infection to the bone;  Unclear is patient already has osteo; bone biopsy may be gold standard to determine    continue offloading     Offloading and using a knee scooter is probably the most important intervention currently     Cont dapto  Cont invanz    Patient is at increased risk for limb loss  Nephrology is opposed to picc line    Hung Haynes MD saw and examined patient, verified hx and PE, read all radiographic studies, reviewed labs and micro data, and formulated dx, plan for treatment and all medical decision making.      Niko Deleon PA-C for Hung Haynes MD    I have seen and examined patient sand agree with above      ALYSSIA Bahena  12/2/2018  3:31 PM                    Electronically signed by Hung Haynes MD at 12/3/2018  8:30 AM       Consult Notes (last 24 hours) (Notes from 12/2/2018  1:56 PM through 12/3/2018  1:56 PM)     No notes of this type exist for this encounter.

## 2021-06-16 NOTE — PROGRESS NOTE ADULT - SUBJECTIVE AND OBJECTIVE BOX
55yFemale s/p ACDF C5-6 POD#1. Pt seen and examined in NAD. Pain controlled. Pt denies any new complaints. Pt denies CP/SOB/N/V/D/numbness/tingling/bowel or bladder dysfunction. YAMILETH drain 7ml in 24hours    PE:     Spine: collar on, Dressing c/d/i . YAMILETH drain in place,   B/L UE: Skin intact. +ROM shoulder/elbow/wrist/fingers. +ok/thumbsup/fingercross signs.  strength: 5/5.  RP2+ NVI.   B/L LE: Skin intact. +ROM hip/knee/ankle/toes. Ankle Dorsi/plantarflexion: 5/5. Calf: soft, compressible and nontender. DP/PT 2+ NVI.                             12.6   16.31 )-----------( 218      ( 16 Jun 2021 06:53 )             37.7       06-16    138  |  107  |  12  ----------------------------<  133<H>  3.8   |  27  |  0.52    Ca    8.8      16 Jun 2021 06:53          A/P: 55yFemale s/p ACDF C5-6 POD#1  Drain removed, New Dressing applied.  Pain controlled with tylenol and flexeril  PT: WBAT - spinal precautions   DVT ppx: SCDs and Ambulation  Wound care, Isometric exercises, incentive spirometry   Discharge: planning Home today as per Dr. Purvis  All the above discussed and understood by pt   
55yFemale s/p ACDF C5-6 POD#0  Pt seen and examined in NAD.   Pain controlled.  Pt denies any new complaints.   Pt denies CP/SOB/N/V/D/numbness/tingling/bowel or bladder dysfunction.   +YAMILETH    Vital Signs Last 24 Hrs  T(C): 36.4 (15 Tyrese 2021 15:55), Max: 37 (15 Tyrese 2021 10:44)  T(F): 97.5 (15 Tyrese 2021 15:55), Max: 98.6 (15 Tyrese 2021 10:44)  HR: 70 (15 Tyrese 2021 15:55) (64 - 79)  BP: 146/77 (15 Tyrese 2021 15:55) (111/63 - 151/78)  BP(mean): --  RR: 16 (15 Tyrese 2021 15:55) (15 - 20)  SpO2: 98% (15 Tyrese 2021 15:55) (92% - 100%)    PE:   General: A&Ox3, NAD  Spine: Dressing c/d/i +YAMILETH   B/L UE: Skin intact. +ROM shoulder/elbow/wrist/fingers. +ok/thumbsup/fingercross signs.  strength: 5/5.  RP2+ NVI.   B/L LE: Skin intact. +ROM hip/knee/ankle/toes. Ankle Dorsi/plantarflexion: 5/5. Calf: soft, compressible and nontender. DP/PT 2+ NVI.                             13.5   11.24 )-----------( 226      ( 15 Tyrese 2021 11:09 )             40.5       06-15    140  |  110<H>  |  13  ----------------------------<  134<H>  4.6   |  24  |  0.60    Ca    8.7      15 Tyrese 2021 11:09          A/P: 55yFemale s/p ACDF C5-6 POD#0  Monitor drain  ABX while drain is in  Wound care  Pain controlled  PT: WBAT: Spinal precautions  Isometric exercises  DVT ppx: SCDs  Incentive spirometry counseled   Discharge: planning   All the above discussed and understood by pt   
DESIRAE CHAMBERS is a 55y Female s/p ANTERIOR CERVICAL DISCECTOMY FUSION C5-6 WITH IMAGE    ANTERIOR CERVICAL DISCECTOMY FUSION C5-7 WITH IMAGE        denies any chest pain shortness of breath palpitation dizziness lightheadedness nausea vomiting fever or chills    T(C): 37 (06-16-21 @ 08:33), Max: 37.2 (06-16-21 @ 01:40)  HR: 81 (06-16-21 @ 10:42) (64 - 81)  BP: 130/56 (06-16-21 @ 10:42) (116/71 - 149/84)  RR: 18 (06-16-21 @ 10:42) (15 - 20)  SpO2: 94% (06-16-21 @ 10:42) (93% - 100%)  no jvd/bruit  s1 s2 rrr  cta  s/nt/nd  no calf tend                        12.6   16.31 )-----------( 218      ( 16 Jun 2021 06:53 )             37.7   06-16    138  |  107  |  12  ----------------------------<  133<H>  3.8   |  27  |  0.52    Ca    8.8      16 Jun 2021 06:53        cont dvt px  pain control  bowel regimen  antiemetics  incentive spirometer

## 2021-06-16 NOTE — DISCHARGE NOTE NURSING/CASE MANAGEMENT/SOCIAL WORK - NSDCFUADDAPPT_GEN_ALL_CORE_FT
Follow up with your surgeon in two weeks. Call for appointment.    If you need more pain medication, call your surgeon's office. For medication refills or authorizations, please call [phone]    We recommend that you call and schedule a follow up appointment within 2-4 weeks with your primary care physician for repeat blood work (CBC and BMP) for post hospital discharge follow-up care.    Call your surgeon if you have increased redness/pain/drainage or fever. Return to ER for shortness of breath/calf tenderness.

## 2021-06-22 DIAGNOSIS — M48.02 SPINAL STENOSIS, CERVICAL REGION: ICD-10-CM

## 2021-06-22 DIAGNOSIS — E78.5 HYPERLIPIDEMIA, UNSPECIFIED: ICD-10-CM

## 2021-06-22 DIAGNOSIS — M54.12 RADICULOPATHY, CERVICAL REGION: ICD-10-CM

## 2021-06-22 DIAGNOSIS — M06.9 RHEUMATOID ARTHRITIS, UNSPECIFIED: ICD-10-CM

## 2021-06-22 DIAGNOSIS — F17.210 NICOTINE DEPENDENCE, CIGARETTES, UNCOMPLICATED: ICD-10-CM

## 2021-06-22 DIAGNOSIS — E11.9 TYPE 2 DIABETES MELLITUS WITHOUT COMPLICATIONS: ICD-10-CM

## 2021-06-22 DIAGNOSIS — Z90.49 ACQUIRED ABSENCE OF OTHER SPECIFIED PARTS OF DIGESTIVE TRACT: ICD-10-CM

## 2021-06-22 DIAGNOSIS — Z98.890 OTHER SPECIFIED POSTPROCEDURAL STATES: ICD-10-CM

## 2021-10-29 PROBLEM — M06.9 RHEUMATOID ARTHRITIS, UNSPECIFIED: Chronic | Status: ACTIVE | Noted: 2021-06-01

## 2021-11-01 ENCOUNTER — APPOINTMENT (OUTPATIENT)
Dept: DISASTER EMERGENCY | Facility: CLINIC | Age: 56
End: 2021-11-01

## 2021-11-02 LAB — SARS-COV-2 N GENE NPH QL NAA+PROBE: NOT DETECTED

## 2021-11-14 ENCOUNTER — LABORATORY RESULT (OUTPATIENT)
Age: 56
End: 2021-11-14

## 2021-11-14 ENCOUNTER — APPOINTMENT (OUTPATIENT)
Dept: DISASTER EMERGENCY | Facility: CLINIC | Age: 56
End: 2021-11-14

## 2021-11-14 DIAGNOSIS — Z01.818 ENCOUNTER FOR OTHER PREPROCEDURAL EXAMINATION: ICD-10-CM

## 2022-03-23 NOTE — PHYSICAL THERAPY INITIAL EVALUATION ADULT - MANUAL MUSCLE TESTING RESULTS, REHAB EVAL
Medication(s) Requested: Diazepam 5mg #30 and Norco 5-325mg #30  Last office visit: 11-4-21  Last refill: 2-14-22 for both  Is the patient due for refill of this medication(s): Yes  PDMP review: Criteria met. Forwarded to Physician/ALEKS for signature.        B LE grossly >/= 3/5

## 2022-06-24 NOTE — H&P PST ADULT - URINARY CATHETER
AS NEEDED FOR ANXIETY
no

## 2023-01-13 NOTE — ASU PREOP CHECKLIST - IDENTIFICATION BAND VERIFIED
Patient : Murtaza Solis Age: 80 year old Sex: male   MRN: [id number] Encounter Date: 1/13/2023      History     Chief Complaint   Patient presents with   • Abdominal Pain     HPI: 80 year old male has a history of CVA, is on chronic warfarin.  He also has a history of acid reflux, coronary artery disease and hypertension.  The patient also reports that he had multiple hernia repair of abdominal hernias with mesh placement in the remote past.  Today the patient reports that he has had pain to the lower abdomen, particularly on the right side which has been going on over the past 10 days.  Pain is worse after he eats a large meal.  He reports having bloating in that area.  The patient denies any nausea or vomiting.  He does report having a decreased appetite recently secondary to the pain.  He also states that he has had problems with constipation over the past month.  He has been taking over-the-counter MiraLax with improvement of his constipation symptoms.  The patient has not had pain of this nature in the past.  He comes to the emergency department for further evaluation.  No Known Allergies    Current Discharge Medication List      Prior to Admission Medications    Details   citalopram (CeleXA) 20 MG tablet TAKE 1 TABLET EVERY DAY  Qty: 90 tablet, Refills: 0      atorvastatin (LIPITOR) 40 MG tablet TAKE 1 TABLET EVERY DAY  Qty: 90 tablet, Refills: 0      warfarin (COUMADIN) 5 MG tablet Take 1 tablet by mouth daily.  Qty: 45 tablet, Refills: 0      lisinopril-hydroCHLOROthiazide (ZESTORETIC) 20-12.5 MG per tablet lisinopril 20 mg-hydrochlorothiazide 12.5 mg tablet   1 tablet daily      spironolactone (ALDACTONE) 25 MG tablet Take 25 mg by mouth daily.      finasteride (PROSCAR) 5 MG tablet TAKE 1 TABLET EVERY DAY  Qty: 90 tablet, Refills: 0      metoPROLOL succinate (TOPROL-XL) 25 MG 24 hr tablet TAKE 1 TABLET EVERY DAY  Qty: 90 tablet, Refills: 0      ofloxacin (FLOXIN) 0.3 % otic solution Place 10 drops into left  ear daily.  Qty: 5 mL, Refills: 0      tamsulosin (FLOMAX) 0.4 MG Cap TAKE 1 CAPSULE EVERY DAY  Qty: 90 capsule, Refills: 3      isosorbide mononitrate (IMDUR) 30 MG 24 hr tablet Take 1 tablet by mouth daily.  Qty: 90 tablet, Refills: 3      amLODIPine (NORVASC) 5 MG tablet Take 5 mg by mouth daily.      clopidogrel (PLAVIX) 75 MG tablet Take 75 mg by mouth daily.      omeprazole (PrilOSEC) 20 MG capsule TAKE 1 CAPSULE BY MOUTH DAILY AT 6 AM             Past Medical History:   Diagnosis Date   • Anxiety disorder    • BPH (benign prostatic hyperplasia)    • CAD (coronary artery disease)    • Chronic pain of both knees    • Closed fracture of right wrist    • CVA (cerebral vascular accident) (CMS/[facility])        • GERD (gastroesophageal reflux disease)    • Heart block     stint placement   • Hernia, inguinal     x4   • HTN (hypertension)    • Hypercholesterolemia        No past surgical history on file.    Family History   Problem Relation Age of Onset   • Cancer Mother         unknown   • Cancer, Lung Father         smoker       Social History     Tobacco Use   • Smoking status: Former     Packs/day: 2.00     Years: 35.00     Pack years: 70.00     Types: Cigarettes     Quit date: 1/3/1983     Years since quittin.0   • Smokeless tobacco: Never   Vaping Use   • Vaping Use: never used   Substance Use Topics   • Drug use: Never       E-cigarette/Vaping   • E-Cigarette/Vaping Use Never Used      E-Cigarette/Vaping Substances & Devices       Review of Systems   Constitutional: Positive for appetite change. Negative for chills and fever.   Gastrointestinal: Positive for abdominal pain and constipation. Negative for nausea and vomiting.   Skin: Negative for rash.   Neurological: Negative for weakness.   Psychiatric/Behavioral: Negative for confusion.       Physical Exam     ED Triage Vitals [23 1147]   ED Triage Vitals Group      Temp 98.3 °F (36.8 °C)      Heart Rate 84      Resp 16      BP (!) 164/89      SpO2  95 %      EtCO2 mmHg       Height       Weight       Weight Scale Used       BMI (Calculated)       IBW/kg (Calculated)        Physical Exam  Vitals and nursing note reviewed.   Constitutional:       Appearance: He is well-developed. He is obese. He is not ill-appearing.   Cardiovascular:      Rate and Rhythm: Normal rate.   Pulmonary:      Effort: Pulmonary effort is normal.      Breath sounds: Normal breath sounds.   Abdominal:      Tenderness: There is abdominal tenderness in the right lower quadrant and periumbilical area. There is no guarding or rebound.      Hernia: No hernia is present.   Skin:     General: Skin is warm.   Neurological:      Mental Status: He is alert and oriented to person, place, and time.      Coordination: Coordination normal.   Psychiatric:         Mood and Affect: Mood normal.         ED Course     Procedures    Lab Results     Results for orders placed or performed during the hospital encounter of 01/13/23   Comprehensive Metabolic Panel   Result Value Ref Range    Fasting Status      Sodium 137 135 - 145 mmol/L    Potassium 3.7 3.4 - 5.1 mmol/L    Chloride 101 97 - 110 mmol/L    Carbon Dioxide 25 21 - 32 mmol/L    Anion Gap 15 7 - 19 mmol/L    Glucose 94 70 - 99 mg/dL    BUN 12 6 - 20 mg/dL    Creatinine 0.77 0.67 - 1.17 mg/dL    Glomerular Filtration Rate >90 >=60    BUN/ Creatinine Ratio 16 7 - 25    Calcium 8.7 8.4 - 10.2 mg/dL    Bilirubin, Total 0.7 0.2 - 1.0 mg/dL    GOT/AST 20 <=37 Units/L    GPT/ALT 26 <64 Units/L    Alkaline Phosphatase 92 45 - 117 Units/L    Albumin 3.6 3.6 - 5.1 g/dL    Protein, Total 7.8 6.4 - 8.2 g/dL    Globulin 4.2 (H) 2.0 - 4.0 g/dL    A/G Ratio 0.9 (L) 1.0 - 2.4   Urinalysis & Reflex Microscopy With Culture If Indicated   Result Value Ref Range    COLOR, URINALYSIS Straw     APPEARANCE, URINALYSIS Clear     GLUCOSE, URINALYSIS Negative Negative mg/dL    BILIRUBIN, URINALYSIS Negative Negative    KETONES, URINALYSIS 15 (A) Negative mg/dL    SPECIFIC  GRAVITY, URINALYSIS 1.013 1.005 - 1.030    OCCULT BLOOD, URINALYSIS Negative Negative    PH, URINALYSIS 6.5 5.0 - 7.0    PROTEIN, URINALYSIS Negative Negative mg/dL    UROBILINOGEN, URINALYSIS 0.2 0.2, 1.0 mg/dL    NITRITE, URINALYSIS Negative Negative    LEUKOCYTE ESTERASE, URINALYSIS Negative Negative   CBC with Automated Differential (performable only)   Result Value Ref Range    WBC 5.5 4.2 - 11.0 K/mcL    RBC 4.81 4.50 - 5.90 mil/mcL    HGB 15.0 13.0 - 17.0 g/dL    HCT 44.5 39.0 - 51.0 %    MCV 92.5 78.0 - 100.0 fl    MCH 31.2 26.0 - 34.0 pg    MCHC 33.7 32.0 - 36.5 g/dL    RDW-CV 13.2 11.0 - 15.0 %    RDW-SD 44.6 39.0 - 50.0 fL     140 - 450 K/mcL    NRBC 0 <=0 /100 WBC    Neutrophil, Percent 69 %    Lymphocytes, Percent 20 %    Mono, Percent 9 %    Eosinophils, Percent 1 %    Basophils, Percent 1 %    Immature Granulocytes 0 %    Absolute Neutrophils 3.8 1.8 - 7.7 K/mcL    Absolute Lymphocytes 1.1 1.0 - 4.0 K/mcL    Absolute Monocytes 0.5 0.3 - 0.9 K/mcL    Absolute Eosinophils  0.1 0.0 - 0.5 K/mcL    Absolute Basophils 0.0 0.0 - 0.3 K/mcL    Absolute Immmature Granulocytes 0.0 0.0 - 0.2 K/mcL   Prothrombin Time   Result Value Ref Range    Prothrombin Time 24.0 (H) 9.7 - 11.8 sec    INR 2.4     LACTIC ACID VENOUS WITH REFLEX - POINT OF CARE   Result Value Ref Range    LACTIC ACID, VENOUS - POINT OF CARE 0.9 <2.0 mmol/L       EKG Results     Radiology Results     Imaging Results          CT ABDOMEN PELVIS W CONTRAST (Final result)  Result time 01/13/23 13:35:32    Final result                 Impression:    IMPRESSION:    1. Right upper pole presumed renal cell carcinomas described above.  2. 14 mm right adrenal nodule, not otherwise specified, possibly an adrenal  adenoma. Further evaluation with nonemergent MRI could be performed.  3. Cholelithiasis.  4. Probable tiny hepatic and renal cortical cyst.  5. Small hiatal hernia.  6. Diverticulosis.  7. Unremarkable bowel gas pattern and appendix.  8. Mild  prominence of the prostate gland with elevation of the bladder  floor.                Narrative:    EXAM: CT ABDOMEN PELVIS W CONTRAST    CLINICAL INFORMATION: RLQ abdominal pain, appendicitis suspected (Age >=  14y), constipation and abdominal bloating, rlq pain.  ? Tics?    COMPARISON: None available    TECHNIQUE:  Using a multidetector, multislice helical CT imaging system, CT  of the abdomen and pelvis is performed following administration of IV  contrast, a dynamic bolus of 100 cc of Omnipaque 300.  Coronal and sagittal  multiplanar reformats.    FINDINGS:      CT ABDOMEN:  Presumed bibasilar fibrotic or atelectatic changes are  present. Included lung bases are otherwise unremarkable.    Cardiac silhouette is normal in size.    A few scattered tiny subcentimeter low-attenuation areas are present within  the liver of uncertain significance. This could represent small cysts. No  enhancing lesions are present.    Multiple stones are seen dependently within the gallbladder. No  pericholecystic fluid or biliary duct dilatation is present.    The spleen, pancreas, left adrenal gland and left kidney are unremarkable  other than a few small presumed left renal cortical cyst. No left renal  mass, calculus or obstruction is present.    There is a 14 mm right adrenal nodule. This is nonspecific but could  represent an adrenal adenoma.    There is a heterogeneous mass measuring approximately 4.2 x 5.2 x 6.0 cm  arising from the upper pole of the right kidney consistent with presumed  renal cell carcinoma. Underwent 2 tiny cysts, the right kidney is otherwise  unremarkable. No calculi or obstruction are present.    Fluid collections or ureteral calculi are present.    The abdominal aorta is normal in caliber without aneurysm. Scattered  calcific plaque involving the aortoiliac and branch vessels.    No retroperitoneal masses or adenopathy are present.    There is a small hiatal hernia.    Descending and sigmoid colonic  diverticulosis present without CT evidence  for diverticulitis.    The bowel gas pattern is otherwise nonspecific and unremarkable without  evidence for free air or obstruction.    The appendix is unremarkable.    Postsurgical changes from ventral herniorrhaphy are present. Metallic  anchors are present.    CT PELVIS:  The bladder is unopacified and nondistended. No distal ureteral  or bladder calculi are present.    The prostate gland is mildly prominent slightly elevated the bladder floor.  Seminal vesicles are symmetric.    No pelvic masses, lymphadenopathy or significant free fluid are present.                                ED Medication Orders (From admission, onward)    Ordered Start     Status Ordering Provider    01/13/23 1209 01/13/23 1210  sodium chloride (NORMAL SALINE) 0.9 % bolus 500 mL  ONCE         Last MAR action: Completed PACO NGUYỄN               MDM:  As above.  Differential diagnosis would include small-bowel obstruction, biliary colic, appendicitis, colon ca, diverticulitis.  The patient does not want anything for pain at this time.  I will order labs, CT abdomen as the above are in he differential.  Gentle IVF will be given.     The patient's labs do not show any concerning abnormalities.  Over the course of his emergency department stay he did not need anything for pain.  His daughter and son were in the room when I rechecked him.  A CT scan of the abdomen does show a kidney mass on the right side, concerning for renal cell carcinoma.  There also appears to be an abnormality in the right adrenal gland.  This is nonspecific.    I did discuss the findings of the potential kidney cancer with KLAUS Quesada from Urology.  She will have the patient follow-up with Dr. Vargas on Monday at 10:30 a.m. in the morning in the [facility].    I discussed the plan with the patient and his family.  The patient does not want anything at home for the pain.  I am suspicious that the findings  on CT are likely the cause of his right-sided abdominal pain.  The patient will follow-up with urology as scheduled.  He understands worsening condition or any emergencies he should return to the ER for re-evaluation    Clinical Impression     ED Diagnosis   1. Kidney mass        2. Abnormal CT of the abdomen      kidney mass, adrenal gland abnormality          Disposition        Discharge 1/13/2023  2:49 PM  Murtaza Solis discharge to home/self care.           Thea Muñoz PA-C  01/13/23 4800     done

## 2023-01-30 NOTE — H&P PST ADULT - NS NEC GEN PE MLT EXAM PC
Patient was seen this morning, she notices on her AVS it says that \"skin lesion of face\" was addressed. She is asking if this can be removed from her appointment notes, since he did not do anything to remove it, it was a referral. She states her appointment will be charged as an office visit and not covered as preventative if that is stated. Please let her know if this can be removed.    No bruits; no thyromegaly or nodules

## 2023-04-01 NOTE — DISCHARGE NOTE PROVIDER - NSDCHHENCOUNTER_GEN_ALL_CORE
[facility]  Hospitalist Discharge Summary      Date of Admission:  3/15/2023  Date of Discharge:  3/31/2023  6:38 PM  Discharging Provider: Damon Richard MD  Discharge Service: Hospitalist Service    Discharge Diagnoses   Rm Villarreal is a 80 year old male with history of mild dementia (lives at home with spouse), hypothyroidism, CAD, bilateral carotid stenosis, chronic atrial fibrillation on apixaban, COPD, hypertension, and ischemic cardiomyopathy who presented to the ED after missing a step and falling down the stairs at home and resultant R hip pain. He had less than 2 beers (confirmed with spouse) evening of 3/15 but denies any prodrome.  Imaging shows a right intertrochanteric proximal femur fracture. OR on 3/16/23 for operative repair.  Post-operatively, he developed worsening encephalopathy, repeat CT head negative for acute findings. Neurology was consulted and recommended MRI brain wwo contrast due to concern for ischemic stroke.  He also has acute blood loss anemia 13>7.6 g/dl. S/p 2u PRBC as of 3/19. HGB has been stable since ranging 8-9 g/dl.  The patient has developed multiorgan failure and remains comatose.  His respiratory status has deteriorated.  The patient has been made no CODE BLUE and the family is considering full comfort care.     Multiorgan failure and including respiratory failure, liver failure and encephalopathy  Goals of care  The patient does not likely have any reversible condition.  Per his healthcare directive he was made no CODE BLUE and although he did not want artificial nutrition it is being continued for now per the family request.  I had a family conference this morning and the family is leaning towards full comfort care.  I did speak with the palliative care physician who is also going to talk with the family today.    Continue hydromorphone as needed for comfort    Continue atropine drops and scopolamine patch for secretion    No CODE  BLUE status     Encephalopathy   2/2  hospital acquired delirium in the setting of underlying dementia   History of dementia  Rule out CVA given concern R MCA infarct   Insomnia  Patient lives at home with his spouse. Take mirtazapine and seroquel at home. Follows with Memory doctor. Spouse reports that he often wanders at night and that usually she shadows him to ensure safety.   Neurology consulted 3/24/2023 due to ongoing/worsening delirium. EEG consistent with encephalopathy.    Holding off on MRI given overall clinical picture     Acute blood loss anemia due to intraoperative blood loss        Hemoglobin   Date Value Ref Range Status   03/31/2023 10.6 (L) 13.3 - 17.7 g/dL Final   03/29/2023 10.0 (L) 13.3 - 17.7 g/dL Final   04/17/2021 14.4 13.3 - 17.7 g/dL Final   12/21/2020 14.3 13.3 - 17.7 g/dL Final    ml. Pre-op hgb 13.3g/dL, post-op hgb 7.6g/dL. S/p 2u PRBC as of 3/19.    Monitor    Continue to hold apixaban     Acute right intertrochanteric proximal femur fracture secondary to mechanical fall  S/p ORIF with cephalomedullary device on 3/17/23.     Post op management per orthopedic surgery.    Enoxaparin for DVT ppx for now. Can resume apixaban as soon as hgb stable and mental status improved.     PT/OT consulted. Unable to participate due confusion and not following commands.     Acute respiratory failure with hypoxia, resolved now worsened due to multiorgan failure  Right moderate pleural effusion  Severe pulmonary hypertension  History of COPD at baseline  He required 2 to 3 L nasal cannula in the hospital on arrival  Chest imaging does indicate a moderate right pleural effusion with some compressive atelectasis. Spouse and pt confirm no recent coughing, fevers/chills.  WBC is unremarkable and he is afebrile.   On room air as of 3/25/23.    Continue oxygen as needed     Coronary artery disease with history of stenting  Ischemic cardiomyopathy  Hypertension  Chronic atrial fibrillation on  apixaban  HFrEF, with acute exacerbation  Acute hypoxic respiratory failure  Echo Oct 2022 showed EF 25-30%, severe hypokinesis noted, RV normal, LA moderately dilated. Severe pulmonary hypertension also noted. Recently torsemide increased to 10mg BID outpatient by cards.  Post operatively, received IV fluids due to elevated lactate. On RA sating mid 90's. JVP elevated.     Holding apixaban, he is currently getting prophylactic dose Lovenox    Resumed PTA Toprol XL after holding on 3/18.    Cardiology consulted, appreciate their assistance    Torsemide on hold for now due to decreased oral intake and elevation in bicarb potentially due to contraction alkalosis.    IV fluids started 3/23/2023 due to altered mental status with significantly decreased oral intake     Lactic acidosis due to multiorgan failure     Hypothyroidism    Continue PTA levothyroxine     Hypernatremia        Sodium   Date Value Ref Range Status   03/31/2023 141 136 - 145 mmol/L Final   04/17/2021 136 133 - 144 mmol/L Final   Na trending down on intravenous D5W.    Changed to NS    Hypokalemia, treated            Potassium     Date   Value   Ref Range   Status     03/31/2023   4.7   3.4 - 5.3 mmol/L   Final             Comment:             Specimen slightly hemolyzed, potassium may be falsely elevated.     01/01/2023   4.0   3.4 - 5.3 mmol/L   Final     04/17/2021   3.7   3.4 - 5.3 mmol/L   Final     Replace as needed      Recent frostbite of all fingertips  Patient was snowblowing this winter without gloves and managed to frostbite the tips of all of his fingers.  SPO2 monitoring is difficult on fingers.    Recommend to monitor SPO2 on ear or toes     Hyperbilirubinemia likely due to liver failure associated with his heart and respiratory failure    Continue supportive care and monitor      Severe malnutrition  Cachexia    On Mirtazapine for appetite stimulant.    Continue tube feedings       Follow-ups Needed After Discharge   Follow-up  Appointments     Follow Up and recommended labs and tests      Please call as soon as possible to make an appointment to be seen in Dr. Brady James's clinic at 8 weeks postop (~5/11/23) for a recheck of   your hip, x-rays, and wound care.       Dr. James's care coordinator is Hortencia Rabago. Please contact her at   [phone] to schedule an appointment.       Dr. James sees patients at 2 clinic locations:  [facility]  [address] [place] [address]  [facility] [place]   [address] , [address], [place] [address]        Please call the on-call phone number [phone] during evenings, nights   and weekends for any urgent needs. Prescription refills must be done   during business hours by calling [phone].         Follow Up and recommended labs and tests      As neeed             Unresulted Labs Ordered in the Past 30 Days of this Admission     Date and Time Order Name Status Description    3/27/2023 10:35 PM Blood Culture Arm, Right Preliminary           Discharge Disposition   Discharge to [facility]    Hospital Course    Rm Villarreal is a 80 year old male with history of mild dementia (lives at home with spouse), hypothyroidism, CAD, bilateral carotid stenosis, chronic atrial fibrillation on apixaban, COPD, hypertension, and ischemic cardiomyopathy who presented to the ED after missing a step and falling down the stairs at home and resultant R hip pain. He had less than 2 beers (confirmed with spouse) evening of 3/15 but denies any prodrome.  Imaging shows a right intertrochanteric proximal femur fracture. OR on 3/16/23 for operative repair.  Post-operatively, he developed worsening encephalopathy, repeat CT head negative for acute findings. Neurology was consulted and recommended MRI brain wwo contrast due to concern for ischemic stroke.  He also has acute blood loss anemia 13>7.6 g/dl. S/p 2u PRBC as of 3/19. HGB has been stable since ranging 8-9  g/dl.  The patient has developed multiorgan failure and remains comatose.  His respiratory status has deteriorated.  The patient has been made no CODE BLUE and the family is considering full comfort care.     Multiorgan failure and including respiratory failure, liver failure and encephalopathy  Goals of care    The patient does not likely have any reversible condition.  Per his healthcare directive he was made no CODE BLUE and although he did not want artificial nutrition it is being continued for now per the family request.  I had a family conference this morning and the palliative care physician also saw the patient and family.  He will be on comfort care and be re-admitted to inpatient hospice status     Encephalopathy   2/2  hospital acquired delirium in the setting of underlying dementia   History of dementia  Rule out CVA given concern R MCA infarct   Insomnia  Patient lives at home with his spouse. Take mirtazapine and seroquel at home. Follows with Memory doctor. Spouse reports that he often wanders at night and that usually she shadows him to ensure safety.   Neurology consulted 3/24/2023 due to ongoing/worsening delirium. EEG consistent with encephalopathy.    Holding off on MRI given overall clinical picture     Acute blood loss anemia due to intraoperative blood loss        Hemoglobin   Date Value Ref Range Status   03/31/2023 10.6 (L) 13.3 - 17.7 g/dL Final   03/29/2023 10.0 (L) 13.3 - 17.7 g/dL Final   04/17/2021 14.4 13.3 - 17.7 g/dL Final   12/21/2020 14.3 13.3 - 17.7 g/dL Final    ml. Pre-op hgb 13.3g/dL, post-op hgb 7.6g/dL. S/p 2u PRBC as of 3/19.    Monitor    Continue to hold apixaban     Acute right intertrochanteric proximal femur fracture secondary to mechanical fall  S/p ORIF with cephalomedullary device on 3/17/23.      Acute respiratory failure with hypoxia, resolved now worsened due to multiorgan failure  Right moderate pleural effusion  Severe pulmonary hypertension  History of  COPD at baseline  He required 2 to 3 L nasal cannula in the hospital on arrival  Chest imaging does indicate a moderate right pleural effusion with some compressive atelectasis. Spouse and pt confirm no recent coughing, fevers/chills.  WBC is unremarkable and he is afebrile.   On room air as of 3/25/23.    Continue oxygen as needed for now- can wean off on inpatient hospice status     Coronary artery disease with history of stenting  Ischemic cardiomyopathy  Hypertension  Chronic atrial fibrillation on apixaban  HFrEF, with acute exacerbation  Acute hypoxic respiratory failure  Echo Oct 2022 showed EF 25-30%, severe hypokinesis noted, RV normal, LA moderately dilated. Severe pulmonary hypertension also noted. Recently torsemide increased to 10mg BID outpatient by cards.  Post operatively, received IV fluids due to elevated lactate. On RA sating mid 90's. JVP elevated.     Transitioning to inpatient hospice status     Lactic acidosis due to multiorgan failure     Hypothyroidism    Will hold levothyroxine on hospice care      Hypernatremia        Sodium   Date Value Ref Range Status   03/31/2023 141 136 - 145 mmol/L Final   04/17/2021 136 133 - 144 mmol/L Final   Na trending down on intravenous D5W.    Hypokalemia, treated       Recent frostbite of all fingertips  Patient was snowblowing this winter without gloves and managed to frostbite the tips of all of his fingers.  SPO2 monitoring is difficult on fingers.     Hyperbilirubinemia likely due to liver failure associated with his heart and respiratory failure      Severe malnutrition  Cachexia    On Mirtazapine for appetite stimulant.    Continue tube feedings - will likely stop when inpatient hospice status      Consultations This Hospital Stay   CARE MANAGEMENT / SOCIAL WORK IP CONSULT  ORTHOPEDIC SURGERY IP CONSULT  CARE MANAGEMENT / SOCIAL WORK IP CONSULT  PHYSICAL THERAPY ADULT IP CONSULT  OCCUPATIONAL THERAPY ADULT IP CONSULT  PHYSICAL THERAPY ADULT IP  CONSULT  OCCUPATIONAL THERAPY ADULT IP CONSULT  CARDIOLOGY IP CONSULT  PHARMACY LIAISON FOR MEDICATION COVERAGE CONSULT  CORE CLINIC EVALUATION IP CONSULT  VASCULAR ACCESS ADULT IP CONSULT  NEUROLOGY IP CONSULT  SPEECH LANGUAGE PATH ADULT IP CONSULT  NUTRITION SERVICES ADULT IP CONSULT  PHARMACY IP CONSULT  PALLIATIVE CARE ADULT IP CONSULT  GIP INPATIENT HOSPICE ADULT CONSULT    Code Status   No CPR- Do NOT Intubate    Time Spent on this Encounter   I, Damon Richard MD, personally saw the patient today and spent less than or equal to 30 minutes discharging this patient.       Damon Richard MD  [facility]  [address] [place] [address]  Phone: [phone]  Fax: [phone]  ______________________________________________________________________    Physical Exam   Vital Signs:         Resp: 20        Weight: 141 lbs 1.51 oz  He is not awake, not in respiratory distress       Primary Care Physician   Greg Royal    Discharge Orders      General info for SNF    Length of Stay Estimate: Short Term Care: Estimated # of Days <30  Condition at Discharge: Stable  Level of care:skilled   Rehabilitation Potential: Fair  Admission H&P remains valid and up-to-date: Yes  Recent Chemotherapy: N/A  Use Nursing Home Standing Orders: Yes     Mantoux instructions    Give two-step Mantoux (PPD) Per Facility Policy Yes     Reason for your hospital stay    S/p right CM nail for an intertrochanteric femur fracture (DOS: 3/16/23)     Additional Discharge Instructions    Pain after surgery is normal and expected.  You will have some amount of pain for several weeks after surgery.  Your pain will improve with time.  There are several things you can do to help reduce your pain including: rest, ice, elevation, and using pain medications as needed. Contact your Surgeon Team if you have pain that persists or worsens after surgery despite rest, ice, elevation, and taking your  medication(s) as prescribed. Contact your Surgeon Team if you have new numbness, tingling, or weakness in your operative extremity.    Swelling and/or bruising of the surgical extremity is common and may persist for several months after surgery. Contact your Surgeon Team if your swelling increases and is NOT associated with an increase in your activity level, or if your swelling increases and is associated with redness and pain.    Ice can be used to control swelling and discomfort after surgery. Place a thin towel over your operative site and apply the ice pack overtop. Leave ice pack in place for 20 minutes, then remove for 20 minutes. Repeat this 20 minutes on/20 minutes off routine as often as tolerated.     Activity - Up with assistive device     Activity - Up with nursing assistance     Weight bearing status    WBAT RLE with walker x 6 weeks     Follow Up and recommended labs and tests    Please call as soon as possible to make an appointment to be seen in Dr. Brady James's clinic at 8 weeks postop (~5/11/23) for a recheck of your hip, x-rays, and wound care.       Dr. James's care coordinator is Hortencia Rabago. Please contact her at [phone] to schedule an appointment.       Dr. James sees patients at 2 clinic locations:  [facility]  [address] [place] [address] [id number] [place] [address] Orthoped[facility]   [address], [address], [place] [address]        Please call the on-call phone number [phone] during evenings, nights and weekends for any urgent needs. Prescription refills must be done during business hours by calling [phone].     Wound care    Surgical dressing removed on 3/30/23. Okay to shower. No soaking/submersion until 4/14/23.     Follow Up and recommended labs and tests    As neeed     Activity - Up with nursing assistance     Reason for your hospital stay    Hip fracture and multiorgan failure     Physical Therapy Adult Consult    Evaluate and  treat as clinically indicated.    Reason: S/p right CM nail for an intertrochanteric femur fracture (DOS: 3/16/23)     Occupational Therapy Adult Consult    Evaluate and treat as clinically indicated.    Reason: S/p right CM nail for an intertrochanteric femur fracture (DOS: 3/16/23)     Fall precautions     Crutches DME    DME Documentation: Describe the reason for need to support medical necessity: Impaired gait status post hip surgery. I, the undersigned, certify that the above prescribed supplies are medically necessary for this patient and is both reasonable and necessary in reference to accepted standards of medical practice in the treatment of this patient's condition and is not prescribed as a convenience.     Cane DME    DME Documentation: Describe the reason for need to support medical necessity: Impaired gait status post hip surgery. I, the undersigned, certify that the above prescribed supplies are medically necessary for this patient and is both reasonable and necessary in reference to accepted standards of medical practice in the treatment of this patient's condition and is not prescribed as a convenience.     Walker DME    : DME Documentation: Describe the reason for need to support medical necessity: Impaired gait status post hip surgery. I, the undersigned, certify that the above prescribed supplies are medically necessary for this patient and is both reasonable and necessary in reference to accepted standards of medical practice in the treatment of this patient's condition and is not prescribed as a convenience.     Diet    npo       Significant Results and Procedures   Most Recent 3 CBC's:Recent Labs   Lab Test 03/31/23  0320 03/29/23  0616 03/28/23  0832   WBC 5.0 9.5 8.9   HGB 10.6* 10.0* 9.9*   * 108* 109*     187 224 239     Most Recent 3 BMP's:Recent Labs   Lab Test 03/31/23  1058 03/31/23  0827 03/31/23  0551 03/31/23  0320 03/30/23  1650 03/29/23  1812 03/29/23  0616  03/28/23  1409 03/28/23  0516   NA  --  141 142  --  139   < > 137  137   < > 143  143   POTASSIUM  --  4.7  --  4.1 3.3*  --  3.9  --  3.7   CHLORIDE  --  109*  --   --   --   --  103  --  109*   CO2  --  17*  --   --   --   --  20*  --  24   BUN  --  21.9  --   --   --   --  29.4*  --  26.3*   CR  --  0.71  --  0.71  --   --  0.93  --  0.78   ANIONGAP  --  15  --   --   --   --  14  --  10   RANDY  --  9.1  --   --   --   --  9.0  --  9.1   * 83  --   --   --   --  120*   < > 106*    < > = values in this interval not displayed.     Most Recent 2 LFT's:Recent Labs   Lab Test 03/31/23  0551 03/29/23  0616   AST 61* 98*   ALT 36 46   ALKPHOS 129 131*   BILITOTAL 7.5* 8.3*     Most Recent 3 Troponin's:Recent Labs   Lab Test 04/18/21  0447 04/18/21  0012 04/17/21  2220   TROPI <0.015 <0.015 <0.015     7-Day Micro Results     No results found for the last 168 hours.      ,   Results for orders placed or performed during the hospital encounter of 03/15/23   Head CT w/o contrast    Narrative    EXAM: CT HEAD W/O CONTRAST  LOCATION: [facility]  DATE/TIME: 3/15/2023 8:29 PM    INDICATION: Fall, trauma, pain.  COMPARISON: CT 06/29/2022.  TECHNIQUE: Routine CT Head without IV contrast. Multiplanar reformats. Dose reduction techniques were used.    FINDINGS:  INTRACRANIAL CONTENTS: No intracranial hemorrhage, extraaxial collection, or mass effect.  No CT evidence of acute infarct. Mild presumed chronic small vessel ischemic changes. Mild to moderate generalized volume loss. No hydrocephalus. The sella is   unremarkable for technique. The cerebellar tonsils are appropriately positioned.    VISUALIZED ORBITS/SINUSES/MASTOIDS: No intraorbital abnormality. No paranasal sinus mucosal disease. No middle ear or mastoid effusion.    BONES/SOFT TISSUES: No scalp hematoma. No skull fracture.      Impression    IMPRESSION:  1.  No evidence of acute intracranial hemorrhage.  2.  No evidence of acute  calvarial fracture.  3.  Age-related changes.   XR Shoulder Right G/E 3 Views    Narrative    EXAM: XR SHOULDER RIGHT G/E 3 VIEWS  LOCATION: [facility]  DATE/TIME: 3/15/2023 8:42 PM    INDICATION: fall, pain  COMPARISON: None.      Impression    IMPRESSION: No acute fracture or malalignment. Mild glenohumeral and moderate acromioclavicular joint degenerative changes. Osteopenia. Aortic atherosclerosis.   Cervical spine CT w/o contrast    Narrative    EXAM: CT CERVICAL SPINE W/O CONTRAST  LOCATION: [facility]  DATE/TIME: 3/15/2023 8:30 PM    INDICATION: Fall, trauma, pain.  COMPARISON: Radiograph 07/25/2019.  TECHNIQUE: Routine CT Cervical Spine without IV contrast. Multiplanar reformats. Dose reduction techniques were used.    FINDINGS:  ALIGNMENT: Mild retrolisthesis at C4-C5 C5-C6. Symmetric facet alignment. Unremarkable alignment of the craniocervical junction.    BONES: The occipital condyles appear intact. Vertebral body heights are unremarkable. There is no evidence of acute displaced fractures. No destructive bony lesions are apparent.    DISCS: Moderate multilevel spondylosis. This is most pronounced at C4-C7, where there is intervertebral disc height loss and disc-osteophyte complex formation. There is a prominent central protrusion at C3-C4.    SPINAL CANAL: No high-grade stenosis.    NEURAL FORAMINA: Moderate right C3-C4, moderate left C4-C5, moderate bilateral C5-C6, and moderate right C6-C7 neural foraminal stenosis.    SOFT TISSUES: No prevertebral soft tissue thickening. Unremarkable paraspinal soft tissues.    UPPER THORAX: Partially imaged right pleural effusion.      Impression     IMPRESSION:  1.  No evidence of acute displaced fracture.  2.  No evidence of traumatic subluxation.  3.  Level degenerative change.  4.  Partially imaged right pleural effusion.   Chest XR,  PA & LAT    Narrative    EXAM: XR CHEST 2 VIEWS  LOCATION: [facility]  DATE/TIME: 3/15/2023 8:44 PM    INDICATION: Shortness of breath. Likely COPD.  COMPARISON: 03/06/2023      Impression    IMPRESSION:     Increased moderate right pleural effusion with adjacent compressive atelectasis. Probable trace left pleural effusion with mild left basilar atelectasis. Otherwise, no focal airspace disease. No pneumothorax.    Stable cardiomegaly.    Multilevel degenerative changes of the spine. Left humeral intramedullary nail, incompletely imaged.   XR Pelvis w Hip Right 1 View    Narrative    EXAM: XR PELVIS AND HIP RIGHT 1 VIEW  LOCATION: [facility]  DATE/TIME: 3/15/2023 8:45 PM    INDICATION: Fall, hip pain.  COMPARISON: None.      Impression    IMPRESSION: Mildly displaced and slightly varus angulated intertrochanteric right proximal femur fracture. No dislocation of the right femoral head from the acetabulum. Lag screw fixation across the left SI joint from left to right. Chronic healed   fracture deformities of the right superior and inferior pubic rami. Mild bilateral hip osteoarthritis. No acute displaced pelvic fracture. Embolization coils project along the right pelvis. Degenerative change lower lumbar spine and both SI joints.   Extensive arterial calcification.    NOTE: ABNORMAL REPORT    THE DICTATION ABOVE DESCRIBES AN ABNORMALITY FOR WHICH FOLLOW-UP IS NEEDED.    XR Femur Right 2 Views    Narrative    EXAM: XR FEMUR RIGHT 2 VIEWS  LOCATION: [facility]  DATE/TIME: 3/15/2023 10:00 PM    INDICATION: hip fracture, need full femur xray  COMPARISON: None.      Impression    IMPRESSION: There is an acute mildly displaced comminuted advanced vascular calcification. Embolic coils are seen in the lower right hemipelvis. The right femur is otherwise normal. Lower intertrochanteric fracture involving the right hip.   XR Femur Port Right 2 Views    Narrative    EXAM: XR FEMUR PORT RIGHT 2 VIEWS  LOCATION: [facility]  DATE/TIME: 3/16/2023 4:44 PM    INDICATION: Status post hip surgery.  COMPARISON: None.      Impression    IMPRESSION: Intramedullary nail with interlocking screw fixation across a mildly displaced intertrochanteric fracture of the right femur. Osteopenia. Mild degenerative changes in the right hip. Embolization coils in the right pelvis. Cannulated screw in   the sacrum.   XR Surgery LORRI L/T 5 Min Fluoro w Stills    Narrative    EXAM: XR SURGERY LORRI FLUORO LESS THAN 5 MIN W STILLS  LOCATION: [facility]  DATE/TIME: 3/16/2023 3:30 PM    INDICATION: Open Reduction Internal Fixation Right Intertrochanteric Hip Fracture  COMPARISON: None.    TECHNIQUE: Intraoperative fluoroscopy performed during the patient's procedure.    FLUOROSCOPIC TIME: 1  NUMBER OF IMAGES: 6    FINDINGS: Acute postoperative changes of an intramedullary nail with fixation of a intertrochanteric fracture of the right hip. Advanced vascular calcification.   XR Chest Port 1 View    Narrative    EXAM: XR CHEST PORT 1 VIEW  LOCATION: [facility]  DATE/TIME: 3/18/2023 5:04 AM    INDICATION: hypoxia, reassess pleural effusions  COMPARISON: 03/15/2023      Impression    IMPRESSION: Heart is enlarged, similar to prior exam. A moderate right-sided pleural effusion is again noted, slightly increased in size from prior exam with surrounding opacification of the right mid and lower lung zone, likely reflecting concurrent   atelectasis although superimposed airspace disease these cannot be excluded.   XR Chest Port 1 View    Narrative    XR CHEST PORT 1 VIEW 3/20/2023 2:54 PM    HISTORY: cough, RLL crackles    COMPARISON: 3/18/2023      Impression    IMPRESSION: Stable small bilateral pleural effusions, right greater  than left and bibasilar patchy opacities, likely due to edema and  atelectasis. Stable cardiomegaly. No pneumothorax. Left humerus dale  and screw fixation.    JOSE HERNANDEZ  MD CLEMENCIA         SYSTEM ID:  [id number]   CT Head w/o Contrast    Narrative    CT SCAN OF THE HEAD WITHOUT CONTRAST   3/24/2023 12:09 PM     HISTORY: Worsening encephalopathy.    TECHNIQUE:  Axial images of the head and coronal reformations without  IV contrast material. Radiation dose for this scan was reduced using  automated exposure control, adjustment of the mA and/or kV according  to patient size, or iterative reconstruction technique.    COMPARISON: CT of the head 3/15/2023.    FINDINGS: There is no evidence of intracranial hemorrhage, mass, acute  infarct or anomaly. The ventricles are normal in size, shape and  configuration. Mild to moderate diffuse parenchymal volume loss. Mild  patchy periventricular white matter hypodensities which are  nonspecific, but likely related to chronic microvascular ischemic  disease. Atherosclerotic calcifications involving the carotid siphons  and to a lesser degree the vertebral arteries.    The visualized portions of the sinuses and mastoids appear normal. The  bony calvarium and bones of the skull base appear intact. Left greater  than right temporomandibular joint degenerative change, advanced on  the left and probably moderate on the right.      Impression    IMPRESSION:  1. No CT findings of acute intracranial process.  2. Brain atrophy and presumed chronic small vessel ischemic change, as  described.     RADHA BARCLAY MD         SYSTEM ID:  [id number]   XR Abdomen Port 1 View    Narrative    XR ABDOMEN PORT 1 VIEW 3/28/2023 12:05 PM    HISTORY: for post feeding tube placement    COMPARISON: None.      Impression    IMPRESSION: Feeding tube is in the stomach. Bowel gas pattern is  nonobstructed.    BETSY SORIA MD         SYSTEM ID:  [id number]   XR Chest Port 1 View    Narrative    CHEST ONE VIEW March 29, 2023 9:25 AM     HISTORY: Hypoxia.    COMPARISON: March 20, 2023.      Impression    IMPRESSION: Some fullness of the right hilum noted although this has  been  recently CT. Nearly resolved bilateral effusions and associated  compressive atelectasis and/or infiltrate. Cardiac size stable.    RADHA PETERS MD         SYSTEM ID:  [id number]   XR Chest Port 1 View    Narrative    EXAM: XR CHEST PORT 1 VIEW  LOCATION: [facility]  DATE/TIME: 3/30/2023 9:25 PM    INDICATION: RRT: acute hypoxic respiratory failure, concern for aspiration  COMPARISON: 2023      Impression    IMPRESSION: Persistent mild basilar atelectasis and small pleural effusions, worse on the right. Vascular indistinctness may reflect edema. Stable enlarged cardiac silhouette. Enteric tube courses into the stomach and off the field-of-view.       XR Chest Port 1 View    Narrative    CHEST PORTABLE 1 VIEW  3/31/2023 11:10 AM       INDICATION: Respiratory distress.  COMPARISON: 3/30/2023       Impression    IMPRESSION: Feeding tube courses below the diaphragm. Persistent  small, right greater than left pleural effusions with underlying  consolidation or atelectasis in the lung bases. Upper lungs clear. No  pneumothorax. No significant interval change.    BETSY SORIA MD         SYSTEM ID:  [id number]   Echocardiogram Limited     Value    LVEF  25-30%    Narrative    [id number]  [id number]  [id number]  [id number]^KATIE^KAYLEN^MARGIE HAILE     [facility]  Echocardiography Laboratory  [address] [place] [address]     Name: AMEE BAILEY  MRN: [id number]  : 1942  Study Date: 2023 03:35 PM  Age: 80 yrs  Gender: Male  Patient Location: [facility]  Reason For Study: CHF  Ordering Physician: KAYLEN WILSON  Referring Physician: KAYLEN WILSON  Performed By: Je Cohen     BSA: 2.0 m2  Height: 72 in  Weight: 170 lb  HR: 104  ______________________________________________________________________________  Procedure  Complete Echo Adult. Optison (NDC #[id number]) given  intravenously.  ______________________________________________________________________________  Interpretation Summary     The rhythm was atrial fibrillation.  Severely decreased left ventricular systolic function  The visual ejection fraction is 25-30%.  There is severe global hypokinesia of the left ventricle.  The left ventricle is normal in size.  There is apical dyskinesis.  There is severe anterior wall hypokinesis.  There is severe inferior wall hypokinesis.  The right ventricle is moderately dilated.  Severely decreased right ventricular systolic function  There is severe biatrial enlargement.  Right ventricular systolic pressure is elevated, consistent with moderate  pulmonary hypertension.  There is moderate (2+) aortic regurgitation.  Moderate bilateral pleural effusion     Pulmonary pressures are lower than last study 10/17/2022 ( estimated PA  systolic pressure was 66mm HG + RAP , now 44 mmHg + RAP)  ______________________________________________________________________________  Left Ventricle  The left ventricle is normal in size. There is normal left ventricular wall  thickness. Severely decreased left ventricular systolic function. The visual  ejection fraction is 25-30%. There is severe global hypokinesia of the left  ventricle. There is apical dyskinesis. There is severe anterior wall  hypokinesis. There is severe inferior wall hypokinesis. There is no thrombus  seen in the left ventricle.     Right Ventricle  The right ventricle is moderately dilated. Severely decreased right  ventricular systolic function.     Atria  There is severe biatrial enlargement. There is no atrial shunt seen. The left  atrial appendage is not well visualized.     Mitral Valve  The mitral valve leaflets appear normal. There is no evidence of stenosis,  fluttering, or prolapse. There is mild (1+) mitral regurgitation. There is no  mitral valve stenosis.     Tricuspid Valve  There is moderate (2+) tricuspid regurgitation.  The right ventricular systolic  pressure is elevated at 44.2 mmHg. Right ventricular systolic pressure is  elevated, consistent with moderate pulmonary hypertension.     Aortic Valve  There is moderate trileaflet aortic sclerosis. There is moderate (2+) aortic  regurgitation. No aortic stenosis is present.     Pulmonic Valve  Normal pulmonic valve. There is mild (1+) pulmonic valvular regurgitation.  There is no pulmonic valvular stenosis.     Vessels  The aortic root is normal size. Normal size ascending aorta. The inferior vena  cava is normal. The pulmonary artery is normal size.     Pericardium  The pericardium appears normal. Moderate bilateral pleural effusion.     Rhythm  The rhythm was atrial fibrillation.  ______________________________________________________________________________  MMode/2D Measurements & Calculations     IVSd: 1.0 cm  LVIDd: 5.5 cm  LVIDs: 4.4 cm  LVPWd: 1.0 cm  FS: 19.3 %  LV mass(C)d: 209.9 grams  LV mass(C)dI: 105.6 grams/m2  Ao root diam: 3.3 cm  asc Aorta Diam: 3.6 cm  LVOT diam: 2.0 cm  LVOT area: 3.1 cm2  LA Volume (BP): 112.0 ml  LA Volume Index (BP): 56.3 ml/m2  RWT: 0.37     TAPSE: 1.2 cm     Doppler Measurements & Calculations  MV E max haider: 61.3 cm/sec  MV dec slope: 394.0 cm/sec2  MV dec time: 0.16 sec  Ao V2 max: 137.7 cm/sec  Ao max P.0 mmHg  RUBIO(V,D): 1.7 cm2  LV V1 max P.3 mmHg  LV V1 max: 75.6 cm/sec  LV V1 VTI: 11.3 cm  SV(LVOT): 35.5 ml  SI(LVOT): 17.9 ml/m2  PA V2 max: 69.7 cm/sec  PA max P.9 mmHg  PA acc time: 0.07 sec  TR max haider: 331.0 cm/sec  TR max P.2 mmHg  AV Haider Ratio (DI): 0.55  Lateral E/e': 3.4  RV S Haider: 10.8 cm/sec     ______________________________________________________________________________  Report approved by: Dr. Mode Hernandez 2023 05:12 PM               Discharge Medications   Discharge Medication List as of 3/31/2023  6:39 PM      START taking these medications    Details   acetaminophen (TYLENOL) 325 MG tablet Take  2 tablets (650 mg) by mouth every 6 hours as needed for other (For optimal non-opioid multimodal pain management to improve pain control.), Disp-100 tablet, R-0, Transitional      atropine 1 % ophthalmic solution Place 2-4 drops under the tongue every 2 hours as needed for other (secretions), No Print Out      benzocaine-menthol (CHLORASEPTIC) 6-10 MG lozenge Place 1 lozenge inside cheek every hour as needed for sore throat (sore throat without fever), No Print Out      bisacodyl (DULCOLAX) 10 MG suppository Place 1 suppository (10 mg) rectally daily as needed for constipation (Use if Magnesium hydroxide (MILK of MAGNESIA) not effective after 24 hours. May discontinue if patient having bowel movement.), No Print Out      haloperidol lactate (HALDOL) 5 MG/ML injection Inject 0.4 mLs (2 mg) into the vein every 6 hours as needed for agitation, No Print Out      HYDROmorphone (DILAUDID) 0.2 MG/ML SOLN injection' Inject 1 mL (0.2 mg) into the vein every hour as needed, R-0, No Print Out      ipratropium - albuterol 0.5 mg/2.5 mg/3 mL (DUONEB) 0.5-2.5 (3) MG/3ML neb solution Take 1 vial (3 mLs) by nebulization every 4 hours as needed for wheezing, Disp-90 mL, No Print Out      ondansetron (ZOFRAN ODT) 4 MG ODT tab Take 1 tablet (4 mg) by mouth every 6 hours as needed for nausea or vomiting, Disp-5 tablet, R-0, Transitional      !! oxyCODONE (ROXICODONE) 5 MG/5ML solution 5 mLs (5 mg) by Oral or Feeding Tube route every 4 hours as needed for moderate pain (4-6), R-0, No Print Out      !! oxyCODONE (ROXICODONE) 5 MG/5ML solution 10 mLs (10 mg) by Oral or Feeding Tube route every 4 hours as needed for severe pain (7-10), R-0, No Print Out      scopolamine (TRANSDERM) 1 MG/3DAYS 72 hr patch Place 1 patch onto the skin every 72 hours, No Print Out       !! - Potential duplicate medications found. Please discuss with provider.      CONTINUE these medications which have CHANGED    Details   mirtazapine (REMERON) 15 MG tablet 1  tablet (15 mg) by Oral or Feeding Tube route every evening, No Print Out         CONTINUE these medications which have NOT CHANGED    Details   hydrocortisone, Perianal, (HYDROCORTISONE) 2.5 % cream Place rectally 2 times daily as needed for hemorrhoidsDisp-30 g, [id number]      melatonin 5 MG tablet Take 5 mg by mouth At Bedtime, Historical      polyethylene glycol (MIRALAX) 17 g packet Take 1 packet by mouth daily as needed for constipation, Historical         STOP taking these medications       apixaban ANTICOAGULANT (ELIQUIS) 5 MG tablet Comments:   Reason for Stopping:         Cyanocobalamin (B-12) 100 MCG TABS Comments:   Reason for Stopping:         enoxaparin ANTICOAGULANT (LOVENOX) 40 MG/0.4ML syringe Comments:   Reason for Stopping:         levothyroxine (SYNTHROID/LEVOTHROID) 25 MCG tablet Comments:   Reason for Stopping:         loperamide (IMODIUM) 2 MG capsule Comments:   Reason for Stopping:         metoprolol succinate ER (TOPROL XL) 25 MG 24 hr tablet Comments:   Reason for Stopping:         nitroGLYcerin (NITROSTAT) 0.4 MG sublingual tablet Comments:   Reason for Stopping:         nystatin (MYCOSTATIN) 358857 UNIT/GM external powder Comments:   Reason for Stopping:         oxyCODONE (ROXICODONE) 5 MG tablet Comments:   Reason for Stopping:         potassium chloride ER (KLOR-CON M) 20 MEQ CR tablet Comments:   Reason for Stopping:         QUEtiapine (SEROQUEL) 25 MG tablet Comments:   Reason for Stopping:         QUEtiapine (SEROQUEL) 25 MG tablet Comments:   Reason for Stopping:         Saccharomyces boulardii (PROBIOTIC) 250 MG CAPS Comments:   Reason for Stopping:         senna-docusate (SENOKOT-S/PERICOLACE) 8.6-50 MG tablet Comments:   Reason for Stopping:         torsemide (DEMADEX) 10 MG tablet Comments:   Reason for Stopping:         Vitamin D3 (CHOLECALCIFEROL) 125 MCG (5000 UT) tablet Comments:   Reason for Stopping:             Allergies   Allergies   Allergen Reactions      15-Tyrese-2021 Atorvastatin Diarrhea     Intolerance to most statin medications

## 2023-12-01 NOTE — PATIENT PROFILE ADULT - NSTOBACCOWITHDRW_GEN_A_CORE_SD
Shift summary:    1900 Bedside report received from [address]. 2000 Shift assessment completed. Pt is intubated on the ventilator and currently receiving vasopressors and sedatives (see MAR for titration details). Pt did not respond to verbal or painful stimuli. Pupils react sluggishly. Pt did have a corneal reflex in the left eye. Pt has mulitple family members at the bedside. VSS. Care ongoing. 0600 Lab called with critical Potassium= 2.8. PRN potassium orders initiated.      Electronically signed by Alejandro Schwartz RN on 12/1/2023 at 6:28 AM irritability

## 2024-05-07 NOTE — H&P PST ADULT - RS GEN PE MLT RESP DETAILS PC
PT is within range continue current Coumadin dose   good air movement/clear to auscultation bilaterally

## 2024-10-31 NOTE — H&P PST ADULT - GENITOURINARY
negative Treatment Goal Explanation (Does Not Render In The Note): Stable for the purposes of categorizing medical decision making is defined by the specific treatment goals for an individual patient. A patient that is not at their treatment goal is not stable, even if the condition has not changed and there is no short- term threat to life or function. Treatment Goal Met?: no